# Patient Record
Sex: MALE | Race: OTHER | NOT HISPANIC OR LATINO | URBAN - METROPOLITAN AREA
[De-identification: names, ages, dates, MRNs, and addresses within clinical notes are randomized per-mention and may not be internally consistent; named-entity substitution may affect disease eponyms.]

---
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
## 2023-04-11 NOTE — ED PEDIATRIC NURSE NOTE - TEMPLATE LIST FOR HEAD TO TOE ASSESSMENT
Neuro Opzelura Counseling:  I discussed with the patient the risks of Opzelura including but not limited to nasopharngitis, bronchitis, ear infection, eosinophila, hives, diarrhea, folliculitis, tonsillitis, and rhinorrhea.  Taken orally, this medication has been linked to serious infections; higher rate of mortality; malignancy and lymphoproliferative disorders; major adverse cardiovascular events; thrombosis; thrombocytopenia, anemia, and neutropenia; and lipid elevations.

## 2023-11-02 ENCOUNTER — INPATIENT (INPATIENT)
Facility: HOSPITAL | Age: 18
LOS: 0 days | Discharge: HOME | DRG: 880 | End: 2023-11-03
Attending: STUDENT IN AN ORGANIZED HEALTH CARE EDUCATION/TRAINING PROGRAM | Admitting: STUDENT IN AN ORGANIZED HEALTH CARE EDUCATION/TRAINING PROGRAM
Payer: COMMERCIAL

## 2023-11-02 VITALS
OXYGEN SATURATION: 100 % | HEIGHT: 72 IN | TEMPERATURE: 98 F | RESPIRATION RATE: 16 BRPM | DIASTOLIC BLOOD PRESSURE: 85 MMHG | WEIGHT: 178.57 LBS | HEART RATE: 67 BPM | SYSTOLIC BLOOD PRESSURE: 144 MMHG

## 2023-11-02 LAB
ALBUMIN SERPL ELPH-MCNC: 4.5 G/DL — SIGNIFICANT CHANGE UP (ref 3.3–5)
ALBUMIN SERPL ELPH-MCNC: 4.5 G/DL — SIGNIFICANT CHANGE UP (ref 3.3–5)
ALP SERPL-CCNC: 81 U/L — SIGNIFICANT CHANGE UP (ref 40–120)
ALP SERPL-CCNC: 81 U/L — SIGNIFICANT CHANGE UP (ref 40–120)
ALT FLD-CCNC: 27 U/L — SIGNIFICANT CHANGE UP (ref 10–45)
ALT FLD-CCNC: 27 U/L — SIGNIFICANT CHANGE UP (ref 10–45)
ANION GAP SERPL CALC-SCNC: 9 MMOL/L — SIGNIFICANT CHANGE UP (ref 5–17)
ANION GAP SERPL CALC-SCNC: 9 MMOL/L — SIGNIFICANT CHANGE UP (ref 5–17)
AST SERPL-CCNC: 26 U/L — SIGNIFICANT CHANGE UP (ref 10–40)
AST SERPL-CCNC: 26 U/L — SIGNIFICANT CHANGE UP (ref 10–40)
BASOPHILS # BLD AUTO: 0.07 K/UL — SIGNIFICANT CHANGE UP (ref 0–0.2)
BASOPHILS # BLD AUTO: 0.07 K/UL — SIGNIFICANT CHANGE UP (ref 0–0.2)
BASOPHILS NFR BLD AUTO: 0.6 % — SIGNIFICANT CHANGE UP (ref 0–2)
BASOPHILS NFR BLD AUTO: 0.6 % — SIGNIFICANT CHANGE UP (ref 0–2)
BILIRUB SERPL-MCNC: 0.6 MG/DL — SIGNIFICANT CHANGE UP (ref 0.2–1.2)
BILIRUB SERPL-MCNC: 0.6 MG/DL — SIGNIFICANT CHANGE UP (ref 0.2–1.2)
BUN SERPL-MCNC: 13 MG/DL — SIGNIFICANT CHANGE UP (ref 7–23)
BUN SERPL-MCNC: 13 MG/DL — SIGNIFICANT CHANGE UP (ref 7–23)
CALCIUM SERPL-MCNC: 9.5 MG/DL — SIGNIFICANT CHANGE UP (ref 8.4–10.5)
CALCIUM SERPL-MCNC: 9.5 MG/DL — SIGNIFICANT CHANGE UP (ref 8.4–10.5)
CHLORIDE SERPL-SCNC: 103 MMOL/L — SIGNIFICANT CHANGE UP (ref 96–108)
CHLORIDE SERPL-SCNC: 103 MMOL/L — SIGNIFICANT CHANGE UP (ref 96–108)
CK SERPL-CCNC: 273 U/L — HIGH (ref 30–200)
CK SERPL-CCNC: 273 U/L — HIGH (ref 30–200)
CO2 SERPL-SCNC: 27 MMOL/L — SIGNIFICANT CHANGE UP (ref 22–31)
CO2 SERPL-SCNC: 27 MMOL/L — SIGNIFICANT CHANGE UP (ref 22–31)
CREAT SERPL-MCNC: 0.92 MG/DL — SIGNIFICANT CHANGE UP (ref 0.5–1.3)
CREAT SERPL-MCNC: 0.92 MG/DL — SIGNIFICANT CHANGE UP (ref 0.5–1.3)
EGFR: 122 ML/MIN/1.73M2 — SIGNIFICANT CHANGE UP
EGFR: 122 ML/MIN/1.73M2 — SIGNIFICANT CHANGE UP
EOSINOPHIL # BLD AUTO: 0.19 K/UL — SIGNIFICANT CHANGE UP (ref 0–0.5)
EOSINOPHIL # BLD AUTO: 0.19 K/UL — SIGNIFICANT CHANGE UP (ref 0–0.5)
EOSINOPHIL NFR BLD AUTO: 1.6 % — SIGNIFICANT CHANGE UP (ref 0–6)
EOSINOPHIL NFR BLD AUTO: 1.6 % — SIGNIFICANT CHANGE UP (ref 0–6)
ETHANOL SERPL-MCNC: <10 MG/DL — SIGNIFICANT CHANGE UP (ref 0–10)
ETHANOL SERPL-MCNC: <10 MG/DL — SIGNIFICANT CHANGE UP (ref 0–10)
GLUCOSE SERPL-MCNC: 98 MG/DL — SIGNIFICANT CHANGE UP (ref 70–99)
GLUCOSE SERPL-MCNC: 98 MG/DL — SIGNIFICANT CHANGE UP (ref 70–99)
HCT VFR BLD CALC: 39.4 % — SIGNIFICANT CHANGE UP (ref 39–50)
HCT VFR BLD CALC: 39.4 % — SIGNIFICANT CHANGE UP (ref 39–50)
HGB BLD-MCNC: 13.7 G/DL — SIGNIFICANT CHANGE UP (ref 13–17)
HGB BLD-MCNC: 13.7 G/DL — SIGNIFICANT CHANGE UP (ref 13–17)
IMM GRANULOCYTES NFR BLD AUTO: 0.3 % — SIGNIFICANT CHANGE UP (ref 0–0.9)
IMM GRANULOCYTES NFR BLD AUTO: 0.3 % — SIGNIFICANT CHANGE UP (ref 0–0.9)
LACTATE SERPL-SCNC: 0.7 MMOL/L — SIGNIFICANT CHANGE UP (ref 0.5–2)
LACTATE SERPL-SCNC: 0.7 MMOL/L — SIGNIFICANT CHANGE UP (ref 0.5–2)
LYMPHOCYTES # BLD AUTO: 2.81 K/UL — SIGNIFICANT CHANGE UP (ref 1–3.3)
LYMPHOCYTES # BLD AUTO: 2.81 K/UL — SIGNIFICANT CHANGE UP (ref 1–3.3)
LYMPHOCYTES # BLD AUTO: 24.1 % — SIGNIFICANT CHANGE UP (ref 13–44)
LYMPHOCYTES # BLD AUTO: 24.1 % — SIGNIFICANT CHANGE UP (ref 13–44)
MAGNESIUM SERPL-MCNC: 1.9 MG/DL — SIGNIFICANT CHANGE UP (ref 1.6–2.6)
MAGNESIUM SERPL-MCNC: 1.9 MG/DL — SIGNIFICANT CHANGE UP (ref 1.6–2.6)
MCHC RBC-ENTMCNC: 30.6 PG — SIGNIFICANT CHANGE UP (ref 27–34)
MCHC RBC-ENTMCNC: 30.6 PG — SIGNIFICANT CHANGE UP (ref 27–34)
MCHC RBC-ENTMCNC: 34.8 GM/DL — SIGNIFICANT CHANGE UP (ref 32–36)
MCHC RBC-ENTMCNC: 34.8 GM/DL — SIGNIFICANT CHANGE UP (ref 32–36)
MCV RBC AUTO: 87.9 FL — SIGNIFICANT CHANGE UP (ref 80–100)
MCV RBC AUTO: 87.9 FL — SIGNIFICANT CHANGE UP (ref 80–100)
MONOCYTES # BLD AUTO: 0.94 K/UL — HIGH (ref 0–0.9)
MONOCYTES # BLD AUTO: 0.94 K/UL — HIGH (ref 0–0.9)
MONOCYTES NFR BLD AUTO: 8.1 % — SIGNIFICANT CHANGE UP (ref 2–14)
MONOCYTES NFR BLD AUTO: 8.1 % — SIGNIFICANT CHANGE UP (ref 2–14)
NEUTROPHILS # BLD AUTO: 7.61 K/UL — HIGH (ref 1.8–7.4)
NEUTROPHILS # BLD AUTO: 7.61 K/UL — HIGH (ref 1.8–7.4)
NEUTROPHILS NFR BLD AUTO: 65.3 % — SIGNIFICANT CHANGE UP (ref 43–77)
NEUTROPHILS NFR BLD AUTO: 65.3 % — SIGNIFICANT CHANGE UP (ref 43–77)
NRBC # BLD: 0 /100 WBCS — SIGNIFICANT CHANGE UP (ref 0–0)
NRBC # BLD: 0 /100 WBCS — SIGNIFICANT CHANGE UP (ref 0–0)
PLATELET # BLD AUTO: 180 K/UL — SIGNIFICANT CHANGE UP (ref 150–400)
PLATELET # BLD AUTO: 180 K/UL — SIGNIFICANT CHANGE UP (ref 150–400)
POTASSIUM SERPL-MCNC: 4.3 MMOL/L — SIGNIFICANT CHANGE UP (ref 3.5–5.3)
POTASSIUM SERPL-MCNC: 4.3 MMOL/L — SIGNIFICANT CHANGE UP (ref 3.5–5.3)
POTASSIUM SERPL-SCNC: 4.3 MMOL/L — SIGNIFICANT CHANGE UP (ref 3.5–5.3)
POTASSIUM SERPL-SCNC: 4.3 MMOL/L — SIGNIFICANT CHANGE UP (ref 3.5–5.3)
PROT SERPL-MCNC: 7.4 G/DL — SIGNIFICANT CHANGE UP (ref 6–8.3)
PROT SERPL-MCNC: 7.4 G/DL — SIGNIFICANT CHANGE UP (ref 6–8.3)
RBC # BLD: 4.48 M/UL — SIGNIFICANT CHANGE UP (ref 4.2–5.8)
RBC # BLD: 4.48 M/UL — SIGNIFICANT CHANGE UP (ref 4.2–5.8)
RBC # FLD: 12.5 % — SIGNIFICANT CHANGE UP (ref 10.3–14.5)
RBC # FLD: 12.5 % — SIGNIFICANT CHANGE UP (ref 10.3–14.5)
SODIUM SERPL-SCNC: 139 MMOL/L — SIGNIFICANT CHANGE UP (ref 135–145)
SODIUM SERPL-SCNC: 139 MMOL/L — SIGNIFICANT CHANGE UP (ref 135–145)
WBC # BLD: 11.65 K/UL — HIGH (ref 3.8–10.5)
WBC # BLD: 11.65 K/UL — HIGH (ref 3.8–10.5)
WBC # FLD AUTO: 11.65 K/UL — HIGH (ref 3.8–10.5)
WBC # FLD AUTO: 11.65 K/UL — HIGH (ref 3.8–10.5)

## 2023-11-02 PROCEDURE — 99222 1ST HOSP IP/OBS MODERATE 55: CPT

## 2023-11-02 PROCEDURE — 99223 1ST HOSP IP/OBS HIGH 75: CPT

## 2023-11-02 PROCEDURE — 99285 EMERGENCY DEPT VISIT HI MDM: CPT

## 2023-11-02 PROCEDURE — 71046 X-RAY EXAM CHEST 2 VIEWS: CPT | Mod: 26

## 2023-11-02 PROCEDURE — 70450 CT HEAD/BRAIN W/O DYE: CPT | Mod: 26,MA

## 2023-11-02 RX ORDER — INFLUENZA VIRUS VACCINE 15; 15; 15; 15 UG/.5ML; UG/.5ML; UG/.5ML; UG/.5ML
0.5 SUSPENSION INTRAMUSCULAR ONCE
Refills: 0 | Status: DISCONTINUED | OUTPATIENT
Start: 2023-11-02 | End: 2023-11-03

## 2023-11-02 RX ORDER — OLANZAPINE 15 MG/1
2.5 TABLET, FILM COATED ORAL ONCE
Refills: 0 | Status: COMPLETED | OUTPATIENT
Start: 2023-11-02 | End: 2023-11-02

## 2023-11-02 RX ORDER — OLANZAPINE 15 MG/1
5 TABLET, FILM COATED ORAL ONCE
Refills: 0 | Status: COMPLETED | OUTPATIENT
Start: 2023-11-02 | End: 2023-11-02

## 2023-11-02 RX ORDER — LEVETIRACETAM 250 MG/1
2000 TABLET, FILM COATED ORAL ONCE
Refills: 0 | Status: COMPLETED | OUTPATIENT
Start: 2023-11-02 | End: 2023-11-02

## 2023-11-02 RX ORDER — LEVETIRACETAM 250 MG/1
500 TABLET, FILM COATED ORAL EVERY 12 HOURS
Refills: 0 | Status: DISCONTINUED | OUTPATIENT
Start: 2023-11-02 | End: 2023-11-02

## 2023-11-02 RX ORDER — LEVETIRACETAM 250 MG/1
1000 TABLET, FILM COATED ORAL ONCE
Refills: 0 | Status: COMPLETED | OUTPATIENT
Start: 2023-11-02 | End: 2023-11-02

## 2023-11-02 RX ADMIN — OLANZAPINE 2.5 MILLIGRAM(S): 15 TABLET, FILM COATED ORAL at 09:45

## 2023-11-02 RX ADMIN — LEVETIRACETAM 400 MILLIGRAM(S): 250 TABLET, FILM COATED ORAL at 04:28

## 2023-11-02 RX ADMIN — LEVETIRACETAM 600 MILLIGRAM(S): 250 TABLET, FILM COATED ORAL at 04:28

## 2023-11-02 RX ADMIN — OLANZAPINE 5 MILLIGRAM(S): 15 TABLET, FILM COATED ORAL at 11:16

## 2023-11-02 RX ADMIN — Medication 2 MILLIGRAM(S): at 04:30

## 2023-11-02 NOTE — DISCHARGE NOTE PROVIDER - NSDCCPCAREPLAN_GEN_ALL_CORE_FT
PRINCIPAL DISCHARGE DIAGNOSIS  Diagnosis: Seizure-like activity  Assessment and Plan of Treatment:      PRINCIPAL DISCHARGE DIAGNOSIS  Diagnosis: Seizure-like activity  Assessment and Plan of Treatment: You had multiple events of shaking at home and in the emergency department concerning for possible seizures and initially were given a medication called Keppra. You were admitted to the neurology service and placed on vEEG. Multiple episodes of extremitiy shaking like the ones you were experiencing at home were captured and were not seizures on the EEG. Lab work was sent after your event to look for markers in your blood that could be elevated if the episodes were seizures and they came back normal, indicating these events were less likely seizures. 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 You were evaluated by psychiatry due to agitation (likely related to Keppra) and cleared you for discharge home and provided resources for outpatient follow up. People with non-epileptic seizures can benefit from following with a psychologist and participating in Cognitive Behavioral Therapy. You can follow up with a neurology provider 6-8 weeks, Dr. Alba or Malinda WESTBROOK in 2 months.

## 2023-11-02 NOTE — DISCHARGE NOTE PROVIDER - CARE PROVIDER_API CALL
Luis Manuel Alba  Neurology  [address] [address]  Phone: [phone]  Fax: [phone]  Follow Up Time: 1 month   Malinda Ryan  Physician Assistant Services  [address] [address]  Phone: [phone]  Fax: [phone]  Follow Up Time: 2 months

## 2023-11-02 NOTE — ED PROVIDER NOTE - OBJECTIVE STATEMENT
18M no PMH brought in by mom for possible seizure activity tonight. pt states he had had some chest pain earlier tonight. states got up to use the bathroom and went back to bed. mom states then he had whole body shaking and fell out of bed. states she couldn't wake him and his whole body was stiff. thinks it may have lasted ~5min. upon awakening pt was slow to answer questions, c/o feeling tired and heavy. no HA. no vomiting. denies drugs/alcohol. had similar episode 10/10 but did not see anyone at that time. no prior hx, no fam hx of seizures. no numbness/weakness. no tongue biting, no urinary incontinence.

## 2023-11-02 NOTE — ED ADULT NURSE NOTE - OBJECTIVE STATEMENT
Pt complaints of seizure like movement w/ witness around 1AM today. Girlfriend reports pt had full body shaking lasting 5 minitues after falling a sleeping. Mom reports finding pt on floor twitching uncontrollably. Pt doesn't remember the event.   Denies any alcohol/drug use. Denies any other discomfort at this time but weakness.  Pt is alert and oriented, A&O4. Family member is on bedside.

## 2023-11-02 NOTE — H&P ADULT - ATTENDING COMMENTS
Pt with concern for recent onset seizures.  In ED, lactate only 0.7.  Girlfriend had recorded video of 2 events, which had uncharacteristic body flopping and limb movements without typical tonic or clonic activity.  Pt loaded with 3000mg levetiracetam after the second event last night - very agitated requiring zyprexa several times and even then became agitated with attempts at rehooking.    Plan:  1) will need further vEEG for evaluation, but suspicion high for non-epileptic events at this point, but not certain.
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
## 2023-11-02 NOTE — BH CONSULTATION LIAISON ASSESSMENT NOTE - NSBHATTESTCOMMENTATTENDFT_PSY_A_CORE
Pt seen and examined separately; case d/w pt's mother. A/P above are by writer - recommend 1:1 observation for agitation risk iso acute delirium. Will follow

## 2023-11-02 NOTE — H&P ADULT - NSHPPHYSICALEXAM_GEN_ALL_CORE
Physical exam:  General: No acute distress, awake and alert    Neurologic:  -Mental status: Awake, alert, oriented to person, place, and time. Speech is fluent with intact naming, repetition, and comprehension, no dysarthria. Recent and remote memory intact. Follows commands. Attention/concentration intact. Fund of knowledge appropriate.  -Cranial nerves:   II: Visual fields are full to confrontation.  III, IV, VI: Extraocular movements are intact without nystagmus. Pupils equally round and reactive to light  V:  Facial sensation V1-V3 equal and intact   VII: Face is symmetric with normal eye closure and smile  VIII: Hearing is bilaterally intact to finger rub  IX, X: Uvula is midline and soft palate rises symmetrically  XI: Head turning and shoulder shrug are intact.  XII: Tongue protrudes midline  Motor: Normal bulk and tone. No pronator drift. Strength bilateral upper extremity 5/5, bilateral lower extremities 5/5.  Rapid alternating movements intact and symmetric  Sensation: Intact to light touch bilaterally. No neglect or extinction on double simultaneous testing.  Coordination: No dysmetria on finger-to-nose and heel-to-shin bilaterally  Reflexes: Downgoing toes bilaterally   Gait: Narrow gait and steady

## 2023-11-02 NOTE — H&P ADULT - NSHPLABSRESULTS_GEN_ALL_CORE
.  LABS:                         13.7   11.65 )-----------( 180      ( 02 Nov 2023 03:35 )             39.4     11-02    139  |  103  |  13  ----------------------------<  98  4.3   |  27  |  0.92    Ca    9.5      02 Nov 2023 03:35  Mg     1.9     11-02    TPro  7.4  /  Alb  4.5  /  TBili  0.6  /  DBili  x   /  AST  26  /  ALT  27  /  AlkPhos  81  11-02      Urinalysis Basic - ( 02 Nov 2023 03:35 )    Color: x / Appearance: x / SG: x / pH: x  Gluc: 98 mg/dL / Ketone: x  / Bili: x / Urobili: x   Blood: x / Protein: x / Nitrite: x   Leuk Esterase: x / RBC: x / WBC x   Sq Epi: x / Non Sq Epi: x / Bacteria: x      CARDIAC MARKERS ( 02 Nov 2023 03:35 )  x     / x     / 273 U/L / x     / x            Lactate, Blood: 0.7 mmol/L (11-02 @ 03:35)      RADIOLOGY, EKG & ADDITIONAL TESTS: Reviewed.

## 2023-11-02 NOTE — H&P ADULT - NSHPSOCIALHISTORY_GEN_ALL_CORE
Patient lives with his mother, girlfriend, is preparing for college admission and works at [employer] part time. He denies any illict drug use, alcohol abuse or smoking.

## 2023-11-02 NOTE — PATIENT PROFILE ADULT - FALL HARM RISK - HARM RISK INTERVENTIONS
Assistance with ambulation/Assistance OOB with selected safe patient handling equipment/Communicate Risk of Fall with Harm to all staff/Discuss with provider need for PT consult/Monitor gait and stability/Reinforce activity limits and safety measures with patient and family/Tailored Fall Risk Interventions/Visual Cue: Yellow wristband and red socks/Bed in lowest position, wheels locked, appropriate side rails in place/Call bell, personal items and telephone in reach/Instruct patient to call for assistance before getting out of bed or chair/Non-slip footwear when patient is out of bed/[place] to call system/Physically safe environment - no spills, clutter or unnecessary equipment/Purposeful Proactive Rounding/Room/bathroom lighting operational, light cord in reach

## 2023-11-02 NOTE — DISCHARGE NOTE PROVIDER - NSDCFUADDAPPT_GEN_ALL_CORE_FT
Psychiatry/Psychology Follow-up:    •	[facility]  o	[email or web address]  o	Three locations  ?	[address] [place] [address]  Phone: [phone]  ?	[address] [facility] [address] [place] [address]  Phone: [phone]  ?	[address] [facility] [address] [place] [address]  Phone: [phone]  o	Medicare, Medicaid, most private insurance and sliding scale  •	[facility]  o	[email or web address]  o	[address]  [place], [place] [address]   [phone] ([phone] for intakes)  o	Medicare, Medicaid, most private insurance (including [employer])  •	[facility]  o	[email or web address]  o	[address], [place] [address]   [phone]  o	Medicare, Medicaid, Aetna, Affinity, [employer], BCBS, [facility], [place], GHI, Healthfirst, HealthPlus, MetroPlus, Wellcare  •	[facility]  o	[email or web address]  o	[address] [place] [address]  [phone]  Intake hours for new patients: Tuesdays and Thursdays at 8am  o	Medicare, Medicaid, most private insurance  •	[facility]  o	[email or web address]  o	[address] [place] [address]  (corner [address])  Telephone: [phone]  o	Medicare, Medicaid, most private insurance  •	[facility]  o	[email or web address]  o	Two locations  ?	[address] ([address])  [place] [address]  [phone]  ?	[address] [place], [place] [address] [phone]  o	Medicare, Medicaid, most private insurance

## 2023-11-02 NOTE — BH CONSULTATION LIAISON ASSESSMENT NOTE - NSBHTIMEACTIVITIESPERFORMED_PSY_A_CORE
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

## 2023-11-02 NOTE — DISCHARGE NOTE PROVIDER - NSDCFUADDINST_GEN_ALL_CORE_FT
Seizure Safety Instructions  1. [facility] law mandates you to self-report your seizure disorder to [facility], and be seizure-free for 1yr before you can drive.   2. Avoid swimming, diving, taking a bath, cooking, use of motorized machineries.  3. Avoid climbing a ladder, trees or any height.  4. Use machines with safety switches.  5. Always be aware of your surroundings and make sure family and friends are aware of your seizures.  6. Use non-breakable dishes.  7. Consider wearing a medical bracelet to inform people you have epilepsy in case of an emergency.

## 2023-11-02 NOTE — H&P ADULT - HISTORY OF PRESENT ILLNESS
Patient is a 19 y/o Male with no PMH who presents after having a seizure like activity at home. Patient states that around 12:40 am he felt some discomfort in his chest and went to the bathroom. He then returned to his bed and the next thing he knows is that he is on the floor and is mother is trying to wake him up. His mother was present at the bedside who states that she heard a thud and found his son on the floor violently shaking. The episode lasted approx. 5 mins and the patient states that after regaining consciousness he was confused for a min. He denies any episode of incontinence or tongue bite. Patient' girlfriend who was present bedside stated that on oct 10 the patient had a similar episode where she saw his limbs jerking while he was asleep but did not seek any medical help. Patient does c/o some headache but denies any double vision, dizziness, numbness or weakness.    In the ED:   Vitals: T 98, HR 67, /85, RR 16, SPO2 100%  Labs: WBC 11.65, Hb 13.7, Na 139, K 4.3, Cr 0.92, Lactate 0.7, , BAL <10  The patient has another seizure while in the ED and is loaded with Keppra 3000mg and admitted to EMU for stabilization and vEEG.  Patient is a 19 y/o Male with no PMH who presents after having a seizure like activity at home. Patient states that around 12:40 am he felt some discomfort in his chest and went to the bathroom. He then returned to his bed and the next thing he knows is that he is on the floor and is mother is trying to wake him up. His mother was present at the bedside who states that she heard a thud and found his son on the floor violently shaking. The episode lasted approx. 5 mins and the patient states that after regaining consciousness he was confused for a min. He denies any episode of incontinence or tongue bite. Patient' girlfriend who was present bedside stated that in march and on oct 10 the patient had a similar episode where she saw his limbs jerking while he was asleep but did not seek any medical help. Patient does c/o some headache but denies any double vision, dizziness, numbness or weakness.    In the ED:   Vitals: T 98, HR 67, /85, RR 16, SPO2 100%  Labs: WBC 11.65, Hb 13.7, Na 139, K 4.3, Cr 0.92, Lactate 0.7, , BAL <10  The patient has another seizure while in the ED and is loaded with Keppra 3000mg and admitted to EMU for stabilization and vEEG.

## 2023-11-02 NOTE — DISCHARGE NOTE PROVIDER - ATTENDING DISCHARGE PHYSICAL EXAMINATION:
Pt awake and alert, though 'blacked-out' during the discussion and had to be repeated.  Affect was mildly withdrawn, but was quick to show he understood the discussion and current diagnosis.  EOM full without nystagmus and no pronator drift and gait normal.

## 2023-11-02 NOTE — BH CONSULTATION LIAISON ASSESSMENT NOTE - RISK ASSESSMENT
At high acute violence risk, likely low acute suicide risk based on available history though interview limited, likely low chronic violence risk. Risk factors include recent confusion with acute agitation, acute medical condition.   Static risk factors include male gender, otherwise unknown  Protective factors include lack of known h/o violence or agitation or suicidality, lack of prior psychiatric dx, residential stability, family support, engaged in work

## 2023-11-02 NOTE — PATIENT PROFILE ADULT - HAS THE PATIENT RECEIVED THE INFLUENZA VACCINE THIS SEASON?
[address] [facility]  [address] [place] [address]  Dept: [phone]  Dept Fax: [phone]  Loc: [phone]     Subjective     Yovanny Grand 53 y.o. female presents 3/5/21 with   Chief Complaint   Patient presents with   Cornelia Garcia Doctor     ne pt    Neck Pain     x 2 weeks        HPI     New patient here to [place], previous PCP in [place]    History of anxiety-Patient was placed on effexor because she was having heart palpitations, thought it was related to anxiety, states the heart palpitations went away around a month later    Patient has been getting hot flashes for the past couple months, thinks effexor is contributing to this, states she would like to cut back, has been on it for two years, has been taking the 75 mg for around 6 months    Also having neck issues, thinks she feels a lump in the back of her neck, just noticed it two weeks ago, it is not sore, does not believe she did anything to her neck, however, she is always lifting and sore in her neck and shoulders, works in [employer]    Also thinks she has an enlarged lymph node, sort of tender, unsure of how long it has been there, couple weeks- months?      Patient states she is also taking Kratom, has helped her with her pain     Reviewed the following history:    Past Medical History:   Diagnosis Date    Anemia, unspecified 3/8/2017    Anxiety       Past Surgical History:   Procedure Laterality Date    HYSTERECTOMY      Partial hysterectomy     Family History   Problem Relation Age of Onset    Diabetes Father     High Blood Pressure Father     High Cholesterol Father     Heart Disease Father     Heart Attack Father     Deep Vein Thrombosis Paternal Grandmother     Heart Attack Paternal Grandfather     Breast Cancer Maternal Aunt     Breast Cancer Maternal Grandmother        No Known Allergies    Current Outpatient Medications on File Prior to Visit   Medication Sig Dispense Refill    venlafaxine (EFFEXOR) 75 MG tablet Take 75 mg by mouth daily       No current facility-administered medications on file prior to visit. Review of Systems   Constitutional: Negative. HENT:        Possible swollen lymph node   Respiratory: Negative. Cardiovascular: Negative. Gastrointestinal: Negative. Musculoskeletal:        Possible lump on neck   Skin: Negative. Objective    Vitals:    03/01/21 1442   BP: 126/78   Pulse: 75   Temp: 97.1 °F (36.2 °C)   SpO2: 96%   Weight: 115 lb (52.2 kg)   Height: 5' 2\" (1.575 m)       Physical Exam  Constitutional:       General: She is not in acute distress. Appearance: Normal appearance. She is not ill-appearing or toxic-appearing. HENT:      Head: Normocephalic and atraumatic. Right Ear: Hearing and external ear normal.      Left Ear: Hearing and external ear normal.   Eyes:      General: Lids are normal.      Conjunctiva/sclera: Conjunctivae normal.   Neck:      Musculoskeletal: Neck supple. Cardiovascular:      Rate and Rhythm: Normal rate and regular rhythm. Heart sounds: Normal heart sounds. Pulmonary:      Effort: Pulmonary effort is normal. No respiratory distress. Breath sounds: Normal breath sounds. No decreased breath sounds, wheezing, rhonchi or rales. Skin:         Neurological:      General: No focal deficit present. Mental Status: She is alert and oriented to person, place, and time. Psychiatric:         Attention and Perception: Attention normal.         Mood and Affect: Mood normal.         Speech: Speech normal.         Behavior: Behavior normal.         Thought Content: Thought content normal.         Cognition and Memory: Cognition normal.       Assessment and Plan    Dayron Vo 53 y.o. female presenting for annual exam, neck issues     1. Annual physical exam  - Comprehensive Metabolic Panel; Future  - Lipid Panel; Future    2.  Need for hepatitis C screening test  - Hepatitis C Antibody; Future    3. Encounter for screening for HIV  - HIV Screen; Future    4. Encounter for screening mammogram for malignant neoplasm of breast  - Mammogram    5. History of anemia  - CBC; Future    6. Anxiety  - CBC; Future  - TSH with Reflex; Future    7. Tender lymph node  - Discussed obtaining US or CT of neck. Patient declined further workup at this time    8. Localized swelling, mass and lump, neck  - Discussed obtaining US or CT of neck. Patient declined further workup at this time      Return after obtaining labs or if symptoms persist, for follow up. Side effects and adverse effects of any medication prescribed today, as well as treatment plan/rationale, follow-up care, and result expectations have been discussed with the patient. Expresses understanding and desires to proceed with treatment plan. Discussed signs and symptoms which require immediate follow-up in ED/call to 911. Understanding verbalized. I have reviewed and updated the electronic medical record.     ARELY Santo - CNP no...
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
## 2023-11-02 NOTE — ED ADULT TRIAGE NOTE - CHIEF COMPLAINT QUOTE
Pt, with no reported PMH, presents to ER c/o "heaviness in my legs and arms" s/p witnessed seizure like activity at ~0050 tonight. Girlfriend reports pt had episode of "full body shaking" lasting ~5 minutes after falling asleep. Mom reports finding pt on floor "twitching uncontrollably". Pt does not recall events, denies any alcohol/drug use. [id number]. Pt reports having epigastric pain, going to restroom and lying down prior to incident. Family reports similar episode on 10/10

## 2023-11-02 NOTE — DISCHARGE NOTE PROVIDER - PROVIDER TOKENS
PROVIDER:[TOKEN:[[id number]],FOLLOWUP:[1 month]] PROVIDER:[TOKEN:[[id number]],FOLLOWUP:[2 months]]

## 2023-11-02 NOTE — BH CONSULTATION LIAISON ASSESSMENT NOTE - NSBHCHARTREVIEWINVESTIGATE_PSY_A_CORE FT
EKG in ED with QTc 355    CT HEAD 11/2    ACC: [id number] EXAM:  CT BRAIN   ORDERED BY: JOHN BROWN     PROCEDURE DATE:  11/02/2023          INTERPRETATION:  INDICATIONS: Possible seizure.    TECHNIQUE: Serial axial images were obtained from the skull base to the   vertex without the use of intravenous contrast. Sagittal and coronal   images were reformatted.    COMPARISON EXAMINATION: CT of the head from 9/25/2017.    FINDINGS:  VENTRICLES AND SULCI: Parenchymal volume is consistent with patient age.  INTRA-AXIAL: No intracranial masslike lesion, acute hemorrhage or acute   transcortical infarct is seen.  EXTRA-AXIAL: No fluid collection is present.  VISUALIZED SINUSES: No air-fluid levels are identified.  VISUALIZED MASTOIDS: Clear.  CALVARIUM: No fracture.    IMPRESSION:  No acute intracranial findings.    --- End of Report ---

## 2023-11-02 NOTE — BH CONSULTATION LIAISON ASSESSMENT NOTE - NSBHCHARTREVIEWVS_PSY_A_CORE FT
Vital Signs Last 24 Hrs  T(C): 36.4 (02 Nov 2023 05:42), Max: 36.7 (02 Nov 2023 02:58)  T(F): 97.6 (02 Nov 2023 05:42), Max: 98 (02 Nov 2023 02:58)  HR: 50 (02 Nov 2023 11:29) (50 - 82)  BP: 107/60 (02 Nov 2023 11:29) (107/60 - 144/85)  BP(mean): --  RR: 18 (02 Nov 2023 11:29) (16 - 18)  SpO2: 100% (02 Nov 2023 11:29) (96% - 100%)    Parameters below as of 02 Nov 2023 11:29  Patient On (Oxygen Delivery Method): room air

## 2023-11-02 NOTE — EEG REPORT - NS EEG TEXT BOX
[facility] Department of Neurology  Inpatient Continuous video-Electroencephalogram  [address], [address] [place] [id number], T: [phone]    Patient Name:	JASMYN MASTERSON    :	1990  MRN:	[id number]    Study Start Date/Time:	2023, 7:03:33 PM  Study End Date/Time:    Referred by:  Choose an item.    Brief Clinical History:  JASMYN MASTERSON is a 33 year old Male with no PMH who presents after having seizure-like activity at home. Patient states that around 12:40 am he felt some discomfort in his chest and went to the bathroom. He then returned to his bed and the next thing he knows is that he is on the floor and is mother is trying to wake him up. His mother was present at the bedside who states that she heard a thud and found his son on the floor violently shaking. The episode lasted approx. 5 mins and the patient states that after regaining consciousness he was confused for a min; Has been seen to have limb jerking from sleep in the past. Study performed to investigate for seizures or markers of epilepsy.   Technologist notes: -  Diagnosis Code:  R56.9 convulsions/seizure    Pertinent Medication:  Keppra 3000mg load    Acquisition Details:  Electroencephalography was acquired using a minimum of 21 channels on an [employer] system v 9.3.1 with electrode placement according to the standard International 10-20 system following ACNS (American Clinical Neurophysiology Society) guidelines.  Anterior temporal T1 and T2 electrodes were utilized whenever possible.  The [employer] automated spike & seizure detections were all reviewed in detail, in addition to the entire raw EEG.    Findings:  Day 1:  2023, 7:03:33 PM to next morning at 07:00 AM   Background:  continuous, with predominantly alpha and beta frequencies.  Generalized Slowing:  None  Symmetry/Focality: No persistent asymmetries of voltage or frequency.     Voltage:  Normal (20+ uV)  Organization:  Appropriate anterior-posterior gradient  Posterior Dominant Rhythm:  9 Hz symmetric, well-organized, and well-modulated  Sleep:  Symmetric, synchronous spindles and K complexes.  Variability:   Yes		Reactivity:  Yes    Spontaneous Activity:  No epileptiform discharges Events:  1)	No electrographic seizures or significant clinical events occurred during this study.  Provocations:  "	Hyperventilation: was not performed.  "	Photic stimulation: was not performed.  Daily Summary:    1)	There were no findings of active epilepsy, however this alone does not rule out the diagnosis.       Luis Manuel Alba MD  Attending Neurologist, [facility]

## 2023-11-02 NOTE — ED PROVIDER NOTE - PROGRESS NOTE DETAILS
pt had episode of tonic clonic seizure - given ativan. will load with keppra pt had episode of tonic clonic seizure - given ativan. per epilepsy - will admit to neuro service, recommend keppra 3000mg loading

## 2023-11-02 NOTE — ED ADULT NURSE NOTE - NSFALLHARMRISKINTERV_ED_ALL_ED

## 2023-11-02 NOTE — ED ADULT NURSE REASSESSMENT NOTE - NS ED NURSE REASSESS COMMENT FT1
Pt had seizure like movement 2 times during sleeping. MD Rivera is on bedside.   Pt wake up but don't remember event.

## 2023-11-02 NOTE — BH CONSULTATION LIAISON ASSESSMENT NOTE - NSBHCHARTREVIEWLAB_PSY_A_CORE FT
13.7   11.65 )-----------( 180      ( 02 Nov 2023 03:35 )             39.4     11-02    139  |  103  |  13  ----------------------------<  98  4.3   |  27  |  0.92    Ca    9.5      02 Nov 2023 03:35  Mg     1.9     11-02    TPro  7.4  /  Alb  4.5  /  TBili  0.6  /  DBili  x   /  AST  26  /  ALT  27  /  AlkPhos  81  11-02    CK elevated  No utox seen

## 2023-11-02 NOTE — DISCHARGE NOTE PROVIDER - HOSPITAL COURSE
HPI  Patient is a 17 y/o Male with no PMH who presents after having a seizure like activity at home. Patient states that around 12:40 am he felt some discomfort in his chest and went to the bathroom. He then returned to his bed and the next thing he knows is that he is on the floor and is mother is trying to wake him up. His mother was present at the bedside who states that she heard a thud and found his son on the floor violently shaking. The episode lasted approx. 5 mins and the patient states that after regaining consciousness he was confused for a min. He denies any episode of incontinence or tongue bite. Patient' girlfriend who was present bedside stated that in march and on oct 10 the patient had a similar episode where she saw his limbs jerking while he was asleep but did not seek any medical help. Patient does c/o some headache but denies any double vision, dizziness, numbness or weakness.    In the ED:   Vitals: T 98, HR 67, /85, RR 16, SPO2 100%  Labs: WBC 11.65, Hb 13.7, Na 139, K 4.3, Cr 0.92, Lactate 0.7, , BAL <10  The patient has another seizure while in the ED and is loaded with Keppra 3000mg and admitted to EMU for stabilization and vEEG.     Hospital course include ........, and EEG showed.... from date to date. Additional tests performed on date, and the results revealed ...... Medical issues and the interventions (if any). Patient is stable and medically cleared for discharge to home/subacute rehab/acute rehab.     Medications adjusted:    New Medications:    Follow-up:  Please follow-up with  (neruologist) in 4 - 6 weeks ( will call you with a date and time).   Please follow-up with your PCP in 2 weeks  Please follow-up with other specialists in 2 - 4 weeks.      HPI  Patient is a 19 y/o Male with no PMH who presents after having a seizure like activity at home. Patient states that around 12:40 am he felt some discomfort in his chest and went to the bathroom. He then returned to his bed and the next thing he knows is that he is on the floor and is mother is trying to wake him up. His mother was present at the bedside who states that she heard a thud and found his son on the floor violently shaking. The episode lasted approx. 5 mins and the patient states that after regaining consciousness he was confused for a min. He denies any episode of incontinence or tongue bite. Patient' girlfriend who was present bedside stated that in march and on oct 10 the patient had a similar episode where she saw his limbs jerking while he was asleep but did not seek any medical help. Patient does c/o some headache but denies any double vision, dizziness, numbness or weakness.    In the ED:   Vitals: T 98, HR 67, /85, RR 16, SPO2 100%  Labs: WBC 11.65, Hb 13.7, Na 139, K 4.3, Cr 0.92, Lactate 0.7, , BAL <10  The patient has another seizure while in the ED and is loaded with Keppra 3000mg and admitted to EMU for stabilization and vEEG.     Hospital course included: Upon admission patient was initially agitated and combative with staff, locked himself in the bathroom twice and punched a wall. Was given Zyprexa 7.5 mg total and placed on a constant observation. He was seen by psychiatry who recommended _____. Later in the evening on 11/2 he became more calm and cooperative and agreed to the EEG placement. Patient had an event of full body shaking with no changes in vital signs and no EEG correlate.     Medications adjusted:    New Medications:    Follow-up:  Please follow-up with  (neruologist) in 4 - 6 weeks ( will call you with a date and time).   Please follow-up with your PCP in 2 weeks  Please follow-up with other specialists in 2 - 4 weeks.      HPI  Patient is a 17 y/o Male with no PMH who presents after having a seizure like activity at home. Patient states that around 12:40 am he felt some discomfort in his chest and went to the bathroom. He then returned to his bed and the next thing he knows is that he is on the floor and is mother is trying to wake him up. His mother was present at the bedside who states that she heard a thud and found his son on the floor violently shaking. The episode lasted approx. 5 mins and the patient states that after regaining consciousness he was confused for a min. He denies any episode of incontinence or tongue bite. Patient' girlfriend who was present bedside stated that in march and on oct 10 the patient had a similar episode where she saw his limbs jerking while he was asleep but did not seek any medical help. Patient does c/o some headache but denies any double vision, dizziness, numbness or weakness.    In the ED:   Vitals: T 98, HR 67, /85, RR 16, SPO2 100%  Labs: WBC 11.65, Hb 13.7, Na 139, K 4.3, Cr 0.92, Lactate 0.7, , BAL <10  The patient has another seizure while in the ED and is loaded with Keppra 3000mg and admitted to EMU for stabilization and vEEG.     Hospital course included: Upon admission patient was initially agitated and combative with staff, locked himself in the bathroom twice and punched a wall. Was given Zyprexa 7.5 mg total and placed on a constant observation. He was seen by psychiatry who recommended initially recommended a 1:1 observation and Zyprexa 5 mg PRN for agitation. Later in the evening on 11/2 he became more calm and cooperative and agreed to the EEG placement. Patient had an event of full body shaking with no changes in vital signs and no EEG correlate. He had 2 more episodes of shaking with no EEG correlate. Lactate and CK were sent after event and were both normal. Events most likely non-epileptic and could be stress and anxiety related. Psych now recommends___. Xray of R hand and elbow were completed due to punching a wall when agitated     Medications adjusted:  None    New Medications:  None    Follow-up:  Please follow-up with Malinda WESTBROOK in 6-8 weeks ( will call you with a date and time).   Please follow-up with your PCP in 2 weeks  Please follow-up with other specialists in 2 - 4 weeks.      HPI  Patient is a 17 y/o Male with no PMH who presents after having a seizure like activity at home. Patient states that around 12:40 am he felt some discomfort in his chest and went to the bathroom. He then returned to his bed and the next thing he knows is that he is on the floor and is mother is trying to wake him up. His mother was present at the bedside who states that she heard a thud and found his son on the floor violently shaking. The episode lasted approx. 5 mins and the patient states that after regaining consciousness he was confused for a min. He denies any episode of incontinence or tongue bite. Patient' girlfriend who was present bedside stated that in march and on oct 10 the patient had a similar episode where she saw his limbs jerking while he was asleep but did not seek any medical help. Patient does c/o some headache but denies any double vision, dizziness, numbness or weakness.    In the ED:   Vitals: T 98, HR 67, /85, RR 16, SPO2 100%  Labs: WBC 11.65, Hb 13.7, Na 139, K 4.3, Cr 0.92, Lactate 0.7, , BAL <10  The patient has another seizure while in the ED and is loaded with Keppra 3000mg and admitted to EMU for stabilization and vEEG.     Hospital course included: Upon admission patient was initially agitated and combative with staff, locked himself in the bathroom twice and punched a wall. Was given Zyprexa 7.5 mg total and placed on a constant observation. He was seen by psychiatry who recommended initially recommended a 1:1 observation and Zyprexa 5 mg PRN for agitation. Later in the evening on 11/2 he became more calm and cooperative and agreed to the EEG placement. Patient had an event of full body shaking with no changes in vital signs and no EEG correlate. He had 2 more episodes of shaking with no EEG correlate. Lactate and CK were sent after event and were both normal. Events most likely non-epileptic and could be stress and anxiety related. Psych has now cleared patient for discharge home and provided recommendations for outpatient follow up.. Xray of R hand and elbow were completed due to punching a wall when agitated which showed no acute fractures or dislocations. Patient is medically and neurologically cleared for discharge home.    Medications adjusted:  None    New Medications:  None    Follow-up:  Please follow-up with Malinda WESTBROOK in 6-8 weeks ( will call you with a date and time).   Please follow-up with your PCP in 2 weeks  Please follow-up with other specialists in 2 - 4 weeks.      HPI  Patient is a 17 y/o Male with no PMH who presents after having seizure-like activity at home. Patient states that around 12:40 am he felt some discomfort in his chest and went to the bathroom. He then returned to his bed and the next thing he knows is that he is on the floor and is mother is trying to wake him up. His mother was present at the bedside who states that she heard a thud and found his son on the floor violently shaking. The episode lasted approx. 5 mins and the patient states that after regaining consciousness he was confused for a min. He denies any episode of incontinence or tongue bite. Patient' girlfriend who was present bedside stated that in march and on oct 10 the patient had a similar episode where she saw his limbs jerking or flailing while he was asleep but did not seek any medical help. She had video of 2 events, which showed shuddering movements of the torso and then limbs, without typical tonic or clonic components.  Patient does c/o some headache but denies any double vision, dizziness, numbness or weakness.    In the ED:   Vitals: T 98, HR 67, /85, RR 16, SPO2 100%  Labs: WBC 11.65, Hb 13.7, Na 139, K 4.3, Cr 0.92, Lactate 0.7, , BAL <10  The patient was witnessed to have another similar event while in the ED and is loaded with Keppra 3000mg and admitted to EMU for stabilization and vEEG.     Hospital course included: Upon admission patient was initially agitated and combative with staff, locked himself in the bathroom twice and punched a wall. Was given Zyprexa 7.5 mg total and placed on a constant observation. He was seen by psychiatry who recommended initially recommended a 1:1 observation and Zyprexa 5 mg PRN for agitation. Later in the evening on 11/2 he became more calm and cooperative and agreed to the EEG placement. Patient had an event of full body shaking with no changes in vital signs and no EEG correlate. He had 2 more episodes of shaking with no EEG correlate. Lactate and CK were sent after event and were both normal. Events most likely non-epileptic and could be stress and anxiety related. Psych has now cleared patient for discharge home and provided recommendations for outpatient follow up.  Xray of R hand and elbow were completed due to punching a wall when agitated which showed no acute fractures or dislocations. Patient is medically and neurologically cleared for discharge home.  The patient and mother were told that the evidence showed at least that these were unlikely to be epileptic in origin, emphasized the positive aspect of the news, and told that often the alternative is a stress-related reaction or dissociative event related in past or current stresses or concerns.  He denied having any specific stresses.  Teach-back was utilized and the patient repeated back the above.    Medications adjusted:  None    New Medications:  None    Follow-up:  Please follow-up with Malinda WESTBROOK in 6-8 weeks ( will call you with a date and time).   Please follow-up with your PCP in 2 weeks  Please follow-up with other specialists in 2 - 4 weeks.

## 2023-11-02 NOTE — CONSULT NOTE ADULT - SUBJECTIVE AND OBJECTIVE BOX
Prn tessalon given per mar for c/o cough.  Patient is increased from 9L to 10L and is 91% Patient is a 18y old  Male who presents with a chief complaint of seizures (02 Nov 2023 18:42)    INTERVAL EVENTS:  Sleepy but arousable at time of exam (had just received zyprexa) ; oriented to self, year, [facility]     SUBJECTIVE:  Patient was seen and examined at bedside.    MEDICATIONS:  MEDICATIONS  (STANDING):  influenza   Vaccine 0.5 milliLiter(s) IntraMuscular once  LORazepam   Injectable 2 milliGRAM(s) IV Push once    MEDICATIONS  (PRN):      Allergies    No Known Allergies    Intolerances        OBJECTIVE:  Vital Signs Last 24 Hrs  T(C): 36.6 (02 Nov 2023 20:38), Max: 36.7 (02 Nov 2023 02:58)  T(F): 97.8 (02 Nov 2023 20:38), Max: 98 (02 Nov 2023 02:58)  HR: 42 (02 Nov 2023 20:38) (42 - 82)  BP: 100/61 (02 Nov 2023 20:38) (100/61 - 144/85)  BP(mean): --  RR: 14 (02 Nov 2023 20:38) (14 - 18)  SpO2: 100% (02 Nov 2023 20:38) (96% - 100%)    Parameters below as of 02 Nov 2023 20:38  Patient On (Oxygen Delivery Method): room air      I&O's Summary      PHYSICAL EXAM:  Gen: Reclining in bed at time of exam, appears stated age  HEENT: MMM, clear OP  Neck: supple, trachea at midline  CV: RRR, +S1/S2  Pulm: adequate respiratory effort, no increase in work of breathing  Abd: soft, ND  Skin: warm and dry,   Ext: no LE edema  Neuro: AOx3, speaking in full sentences  Psych: affect and behavior appropriate, pleasant at time of interview    LABS:                        13.7   11.65 )-----------( 180      ( 02 Nov 2023 03:35 )             39.4     11-02    139  |  103  |  13  ----------------------------<  98  4.3   |  27  |  0.92    Ca    9.5      02 Nov 2023 03:35  Mg     1.9     11-02    TPro  7.4  /  Alb  4.5  /  TBili  0.6  /  DBili  x   /  AST  26  /  ALT  27  /  AlkPhos  81  11-02    LIVER FUNCTIONS - ( 02 Nov 2023 03:35 )  Alb: 4.5 g/dL / Pro: 7.4 g/dL / ALK PHOS: 81 U/L / ALT: 27 U/L / AST: 26 U/L / GGT: x             CAPILLARY BLOOD GLUCOSE      POCT Blood Glucose.: 96 mg/dL (02 Nov 2023 02:58)    Urinalysis Basic - ( 02 Nov 2023 03:35 )    Color: x / Appearance: x / SG: x / pH: x  Gluc: 98 mg/dL / Ketone: x  / Bili: x / Urobili: x   Blood: x / Protein: x / Nitrite: x   Leuk Esterase: x / RBC: x / WBC x   Sq Epi: x / Non Sq Epi: x / Bacteria: x        MICRODATA:      RADIOLOGY/OTHER STUDIES:

## 2023-11-02 NOTE — BH CONSULTATION LIAISON ASSESSMENT NOTE - CURRENT MEDICATION
MEDICATIONS  (STANDING):  influenza   Vaccine 0.5 milliLiter(s) IntraMuscular once  LORazepam   Injectable 2 milliGRAM(s) IV Push once    MEDICATIONS  (PRN):

## 2023-11-02 NOTE — ED PROVIDER NOTE - CLINICAL SUMMARY MEDICAL DECISION MAKING FREE TEXT BOX
episode of fully body shaking, unresponsive, stiffness, then followed by lethargy, possible new onset seizure. similar episode 10/10. no focal neuro deficits currently, no HA, doubt sah  -check labs  -ekg  -cxr  -CT head r/o mass  -epilepsy c/s

## 2023-11-02 NOTE — CONSULT NOTE ADULT - ASSESSMENT
17 y/o Male with minimal PMHx in our EMR, admitted after having seizure like activity at home. Had another seizure while in the ED , received Keppra load , 3000mg and admitted to EMU for stabilization and vEEG.     # Seizure like activity   s/p Keppra load. However, Keppra on pause at the moment   Rest of evaluation and management per epilepsy team   IV lorazepam prn ordered in case of seizure.     # Agitation   Antipsychotic dose and frequency per behavioral health consult     High MDM   Acute illness - Seizures:  Close monitoring with : vEEG while on/off antiepileptic medications   Reviewed Hgb, serum sodium, creatinine  Discussed plan of care with epilepsy ACP  Received intravenous lorazepam for seizure like activity, agitation

## 2023-11-02 NOTE — H&P ADULT - ASSESSMENT
Patient is a 17 y/o Male with no PMH who presents after having a seizure like activity at home. The patient has another seizure while in the ED and is loaded with Keppra 3000mg and admitted to EMU for stabilization and vEEG.     PLAN  Neuro  # Nocturnal seizures   -s/p Keppra 3000mg load   - started on Keppra 500mg BID   - vEEG monitoring   - Seizure & Fall precautions  - Ativan 2mg IVP for seizures > 2mins    Pulm  - spo2 goal >92-94%    Misc  F: none  E: replete prn   N: regular diet  A: none  GI: none  DVT ppx: none  FULL CODE  EMU

## 2023-11-03 VITALS
HEART RATE: 62 BPM | DIASTOLIC BLOOD PRESSURE: 67 MMHG | OXYGEN SATURATION: 97 % | RESPIRATION RATE: 16 BRPM | SYSTOLIC BLOOD PRESSURE: 119 MMHG | TEMPERATURE: 98 F

## 2023-11-03 DIAGNOSIS — Z29.9 ENCOUNTER FOR PROPHYLACTIC MEASURES, UNSPECIFIED: ICD-10-CM

## 2023-11-03 DIAGNOSIS — R45.1 RESTLESSNESS AND AGITATION: ICD-10-CM

## 2023-11-03 DIAGNOSIS — R56.9 UNSPECIFIED CONVULSIONS: ICD-10-CM

## 2023-11-03 DIAGNOSIS — F44.5 CONVERSION DISORDER WITH SEIZURES OR CONVULSIONS: ICD-10-CM

## 2023-11-03 LAB
AMPHET UR-MCNC: NEGATIVE — SIGNIFICANT CHANGE UP
AMPHET UR-MCNC: NEGATIVE — SIGNIFICANT CHANGE UP
ANION GAP SERPL CALC-SCNC: 10 MMOL/L — SIGNIFICANT CHANGE UP (ref 5–17)
ANION GAP SERPL CALC-SCNC: 10 MMOL/L — SIGNIFICANT CHANGE UP (ref 5–17)
BARBITURATES UR SCN-MCNC: NEGATIVE — SIGNIFICANT CHANGE UP
BARBITURATES UR SCN-MCNC: NEGATIVE — SIGNIFICANT CHANGE UP
BENZODIAZ UR-MCNC: NEGATIVE — SIGNIFICANT CHANGE UP
BENZODIAZ UR-MCNC: NEGATIVE — SIGNIFICANT CHANGE UP
BUN SERPL-MCNC: 12 MG/DL — SIGNIFICANT CHANGE UP (ref 7–23)
BUN SERPL-MCNC: 12 MG/DL — SIGNIFICANT CHANGE UP (ref 7–23)
CALCIUM SERPL-MCNC: 9.3 MG/DL — SIGNIFICANT CHANGE UP (ref 8.4–10.5)
CALCIUM SERPL-MCNC: 9.3 MG/DL — SIGNIFICANT CHANGE UP (ref 8.4–10.5)
CHLORIDE SERPL-SCNC: 105 MMOL/L — SIGNIFICANT CHANGE UP (ref 96–108)
CHLORIDE SERPL-SCNC: 105 MMOL/L — SIGNIFICANT CHANGE UP (ref 96–108)
CK SERPL-CCNC: 191 U/L — SIGNIFICANT CHANGE UP (ref 30–200)
CK SERPL-CCNC: 191 U/L — SIGNIFICANT CHANGE UP (ref 30–200)
CO2 SERPL-SCNC: 24 MMOL/L — SIGNIFICANT CHANGE UP (ref 22–31)
CO2 SERPL-SCNC: 24 MMOL/L — SIGNIFICANT CHANGE UP (ref 22–31)
COCAINE METAB.OTHER UR-MCNC: NEGATIVE — SIGNIFICANT CHANGE UP
COCAINE METAB.OTHER UR-MCNC: NEGATIVE — SIGNIFICANT CHANGE UP
CREAT SERPL-MCNC: 0.96 MG/DL — SIGNIFICANT CHANGE UP (ref 0.5–1.3)
CREAT SERPL-MCNC: 0.96 MG/DL — SIGNIFICANT CHANGE UP (ref 0.5–1.3)
EGFR: 116 ML/MIN/1.73M2 — SIGNIFICANT CHANGE UP
EGFR: 116 ML/MIN/1.73M2 — SIGNIFICANT CHANGE UP
GLUCOSE SERPL-MCNC: 88 MG/DL — SIGNIFICANT CHANGE UP (ref 70–99)
GLUCOSE SERPL-MCNC: 88 MG/DL — SIGNIFICANT CHANGE UP (ref 70–99)
HCT VFR BLD CALC: 42 % — SIGNIFICANT CHANGE UP (ref 39–50)
HCT VFR BLD CALC: 42 % — SIGNIFICANT CHANGE UP (ref 39–50)
HGB BLD-MCNC: 14.3 G/DL — SIGNIFICANT CHANGE UP (ref 13–17)
HGB BLD-MCNC: 14.3 G/DL — SIGNIFICANT CHANGE UP (ref 13–17)
LACTATE SERPL-SCNC: 1.4 MMOL/L — SIGNIFICANT CHANGE UP (ref 0.5–2)
LACTATE SERPL-SCNC: 1.4 MMOL/L — SIGNIFICANT CHANGE UP (ref 0.5–2)
MAGNESIUM SERPL-MCNC: 2 MG/DL — SIGNIFICANT CHANGE UP (ref 1.6–2.6)
MAGNESIUM SERPL-MCNC: 2 MG/DL — SIGNIFICANT CHANGE UP (ref 1.6–2.6)
MCHC RBC-ENTMCNC: 30.2 PG — SIGNIFICANT CHANGE UP (ref 27–34)
MCHC RBC-ENTMCNC: 30.2 PG — SIGNIFICANT CHANGE UP (ref 27–34)
MCHC RBC-ENTMCNC: 34 GM/DL — SIGNIFICANT CHANGE UP (ref 32–36)
MCHC RBC-ENTMCNC: 34 GM/DL — SIGNIFICANT CHANGE UP (ref 32–36)
MCV RBC AUTO: 88.6 FL — SIGNIFICANT CHANGE UP (ref 80–100)
MCV RBC AUTO: 88.6 FL — SIGNIFICANT CHANGE UP (ref 80–100)
METHADONE UR-MCNC: NEGATIVE — SIGNIFICANT CHANGE UP
METHADONE UR-MCNC: NEGATIVE — SIGNIFICANT CHANGE UP
NRBC # BLD: 0 /100 WBCS — SIGNIFICANT CHANGE UP (ref 0–0)
NRBC # BLD: 0 /100 WBCS — SIGNIFICANT CHANGE UP (ref 0–0)
OPIATES UR-MCNC: NEGATIVE — SIGNIFICANT CHANGE UP
OPIATES UR-MCNC: NEGATIVE — SIGNIFICANT CHANGE UP
PCP SPEC-MCNC: SIGNIFICANT CHANGE UP
PCP SPEC-MCNC: SIGNIFICANT CHANGE UP
PCP UR-MCNC: NEGATIVE — SIGNIFICANT CHANGE UP
PCP UR-MCNC: NEGATIVE — SIGNIFICANT CHANGE UP
PHOSPHATE SERPL-MCNC: 4.8 MG/DL — HIGH (ref 2.5–4.5)
PHOSPHATE SERPL-MCNC: 4.8 MG/DL — HIGH (ref 2.5–4.5)
PLATELET # BLD AUTO: 172 K/UL — SIGNIFICANT CHANGE UP (ref 150–400)
PLATELET # BLD AUTO: 172 K/UL — SIGNIFICANT CHANGE UP (ref 150–400)
POTASSIUM SERPL-MCNC: 4.1 MMOL/L — SIGNIFICANT CHANGE UP (ref 3.5–5.3)
POTASSIUM SERPL-MCNC: 4.1 MMOL/L — SIGNIFICANT CHANGE UP (ref 3.5–5.3)
POTASSIUM SERPL-SCNC: 4.1 MMOL/L — SIGNIFICANT CHANGE UP (ref 3.5–5.3)
POTASSIUM SERPL-SCNC: 4.1 MMOL/L — SIGNIFICANT CHANGE UP (ref 3.5–5.3)
PROLACTIN SERPL-MCNC: 18.8 NG/ML — HIGH (ref 4.1–18.4)
PROLACTIN SERPL-MCNC: 18.8 NG/ML — HIGH (ref 4.1–18.4)
RBC # BLD: 4.74 M/UL — SIGNIFICANT CHANGE UP (ref 4.2–5.8)
RBC # BLD: 4.74 M/UL — SIGNIFICANT CHANGE UP (ref 4.2–5.8)
RBC # FLD: 12.6 % — SIGNIFICANT CHANGE UP (ref 10.3–14.5)
RBC # FLD: 12.6 % — SIGNIFICANT CHANGE UP (ref 10.3–14.5)
SODIUM SERPL-SCNC: 139 MMOL/L — SIGNIFICANT CHANGE UP (ref 135–145)
SODIUM SERPL-SCNC: 139 MMOL/L — SIGNIFICANT CHANGE UP (ref 135–145)
THC UR QL: NEGATIVE — SIGNIFICANT CHANGE UP
THC UR QL: NEGATIVE — SIGNIFICANT CHANGE UP
WBC # BLD: 6.55 K/UL — SIGNIFICANT CHANGE UP (ref 3.8–10.5)
WBC # BLD: 6.55 K/UL — SIGNIFICANT CHANGE UP (ref 3.8–10.5)
WBC # FLD AUTO: 6.55 K/UL — SIGNIFICANT CHANGE UP (ref 3.8–10.5)
WBC # FLD AUTO: 6.55 K/UL — SIGNIFICANT CHANGE UP (ref 3.8–10.5)

## 2023-11-03 PROCEDURE — 99239 HOSP IP/OBS DSCHRG MGMT >30: CPT

## 2023-11-03 PROCEDURE — 99233 SBSQ HOSP IP/OBS HIGH 50: CPT

## 2023-11-03 PROCEDURE — 71046 X-RAY EXAM CHEST 2 VIEWS: CPT

## 2023-11-03 PROCEDURE — 73130 X-RAY EXAM OF HAND: CPT

## 2023-11-03 PROCEDURE — 95819 EEG AWAKE AND ASLEEP: CPT

## 2023-11-03 PROCEDURE — 95718 EEG PHYS/QHP 2-12 HR W/VEEG: CPT

## 2023-11-03 PROCEDURE — 83735 ASSAY OF MAGNESIUM: CPT

## 2023-11-03 PROCEDURE — 80307 DRUG TEST PRSMV CHEM ANLYZR: CPT

## 2023-11-03 PROCEDURE — 84100 ASSAY OF PHOSPHORUS: CPT

## 2023-11-03 PROCEDURE — 82550 ASSAY OF CK (CPK): CPT

## 2023-11-03 PROCEDURE — 80048 BASIC METABOLIC PNL TOTAL CA: CPT

## 2023-11-03 PROCEDURE — 70450 CT HEAD/BRAIN W/O DYE: CPT | Mod: MA

## 2023-11-03 PROCEDURE — 73070 X-RAY EXAM OF ELBOW: CPT

## 2023-11-03 PROCEDURE — 85025 COMPLETE CBC W/AUTO DIFF WBC: CPT

## 2023-11-03 PROCEDURE — 80053 COMPREHEN METABOLIC PANEL: CPT

## 2023-11-03 PROCEDURE — 73070 X-RAY EXAM OF ELBOW: CPT | Mod: 26,RT

## 2023-11-03 PROCEDURE — 83605 ASSAY OF LACTIC ACID: CPT

## 2023-11-03 PROCEDURE — 36415 COLL VENOUS BLD VENIPUNCTURE: CPT

## 2023-11-03 PROCEDURE — 73130 X-RAY EXAM OF HAND: CPT | Mod: 26,RT

## 2023-11-03 PROCEDURE — 84146 ASSAY OF PROLACTIN: CPT

## 2023-11-03 PROCEDURE — 85027 COMPLETE CBC AUTOMATED: CPT

## 2023-11-03 PROCEDURE — 82962 GLUCOSE BLOOD TEST: CPT

## 2023-11-03 PROCEDURE — 99285 EMERGENCY DEPT VISIT HI MDM: CPT | Mod: 25

## 2023-11-03 RX ORDER — ACETAMINOPHEN 500 MG
1000 TABLET ORAL ONCE
Refills: 0 | Status: COMPLETED | OUTPATIENT
Start: 2023-11-03 | End: 2023-11-03

## 2023-11-03 RX ADMIN — Medication 1000 MILLIGRAM(S): at 09:44

## 2023-11-03 RX ADMIN — Medication 1000 MILLIGRAM(S): at 10:28

## 2023-11-03 NOTE — BH CONSULTATION LIAISON PROGRESS NOTE - NSBHCHARTREVIEWVS_PSY_A_CORE FT
Vital Signs Last 24 Hrs  T(C): 36.1 (03 Nov 2023 09:13), Max: 36.6 (02 Nov 2023 20:38)  T(F): 97 (03 Nov 2023 09:13), Max: 97.9 (03 Nov 2023 07:45)  HR: 52 (03 Nov 2023 09:13) (42 - 56)  BP: 119/65 (03 Nov 2023 09:13) (99/60 - 119/65)  BP(mean): --  RR: 16 (03 Nov 2023 09:13) (14 - 18)  SpO2: 97% (03 Nov 2023 09:13) (97% - 100%)    Parameters below as of 03 Nov 2023 09:13  Patient On (Oxygen Delivery Method): room air

## 2023-11-03 NOTE — BH CONSULTATION LIAISON PROGRESS NOTE - NSBHCHARTREVIEWLAB_PSY_A_CORE FT
14.3   6.55  )-----------( 172      ( 03 Nov 2023 07:55 )             42.0     11-03    139  |  105  |  12  ----------------------------<  88  4.1   |  24  |  0.96    Ca    9.3      03 Nov 2023 07:55  Phos  4.8     11-03  Mg     2.0     11-03    TPro  7.4  /  Alb  4.5  /  TBili  0.6  /  DBili  x   /  AST  26  /  ALT  27  /  AlkPhos  81  11-02

## 2023-11-03 NOTE — EEG REPORT - NS EEG TEXT BOX
[facility] Department of Neurology  Inpatient Continuous video-Electroencephalography Report    Acquisition Details:  Electroencephalography was acquired using a minimum of 21 channels on an [employer] Neurology system v 9.3.1 with electrode placement according to the standard International 10-20 system following ACNS (American Clinical Neurophysiology Society) guidelines for Long-Term Video EEG monitoring.  Anterior temporal T1 and T2 electrodes were utilized whenever possible.   The [employer] automated spike & seizure detections were all reviewed in detail, in addition to extensive portions of raw EEG.      Daily Updates (from 07:00 am until 07:00 am):  Day 2   Nov 2-3, 2023:   Background:  continuous, with predominantly alpha and beta frequencies.  Generalized Slowing:  None  Symmetry/Focality: No persistent asymmetries of voltage or frequency.     Voltage:  Normal (20+ uV)  Organization:  Appropriate anterior-posterior gradient  Posterior Dominant Rhythm:  9 Hz symmetric, well-organized, and well-modulated  Sleep:  Symmetric, synchronous spindles and K complexes.  Variability:   Yes		Reactivity:  Yes    Spontaneous Activity:  No epileptiform discharges   Events:  1)	No electrographic seizures or significant clinical events occurred during this study.  Provocations:  •	Hyperventilation: was not performed.  •	Photic stimulation: was not performed.  Daily Summary:    1)	There were no findings of active epilepsy, however this alone does not rule out the diagnosis.   2)	There was excess beta diffusely, which may be an expected effect of medications such as benzodiazepines.      Luis Manuel Alba MD  Attending Neurologist, [facility]

## 2023-11-03 NOTE — DISCHARGE NOTE NURSING/CASE MANAGEMENT/SOCIAL WORK - NSDCFUADDAPPT_GEN_ALL_CORE_FT
Psychiatry/Psychology Follow-up:    •	[facility]  o	[email or web address]  o	Three locations  ?	[address] [place] [address]  Phone: [phone]  ?	[address] [facility] [address] [place] [address]  Phone: [phone]  ?	[address] [facility] [address] [place] [address]  Phone: [phone]  o	Medicare, Medicaid, most private insurance and sliding scale  •	[facility]  o	[email or web address]  o	[address]  [place], [place] [address]   [phone] ([phone] for intakes)  o	Medicare, Medicaid, most private insurance (including [employer])  •	[facility]  o	[email or web address]  o	[address], [place] [address]   [phone]  o	Medicare, Medicaid, Aetna, [employer]inity, [employer], BCBS, [facility] [facility], [employer], GHI, Healthfirst, HealthPlus, MetroPlus, Wellcare  •	[facility]  o	[email or web address]  o	[address] [place] [address]  [phone]  Intake hours for new patients: Tuesdays and Thursdays at 8am  o	Medicare, Medicaid, most private insurance  •	[facility]  o	[email or web address]  o	[address] [place] [address]  (corner [address])  Telephone: [phone]  o	Medicare, Medicaid, most private insurance  •	[facility]  o	[email or web address]  o	Two locations  ?	[address] ([address])  [place] [address]  [phone]  ?	[address] [place], [place] [address] [phone]  o	Medicare, Medicaid, most private insurance

## 2023-11-03 NOTE — DISCHARGE NOTE NURSING/CASE MANAGEMENT/SOCIAL WORK - NSDCPEFALRISK_GEN_ALL_CORE
For information on Fall & Injury Prevention, visit: [email or web address] OR  [email or web address] OR  https://www.cdc.gov/steadi/patient.html

## 2023-11-03 NOTE — PROGRESS NOTE ADULT - ASSESSMENT
Patient is a 17 y/o Male with no PMH who presents after having a seizure like activity at home. The patient had another episode while in the ED concerning for seizure and was given Ativan 2 mg IV and loaded with Keppra 3000mg. Workup in head with LA 0.7 and negative CTH. Admitted to EMU for further evaluation and vEEG monitoring. On 11/2 patient agitated and aggressive, code gray x2 called and received a total of 7.5 mg Zyprexa. Initially refusing EEG, but now more agreeable. Event captured on EEG of full body shaking with no correlate. Reviewed videos of events recorded at home and appeared to have asymmetric limb jerking. Episodes of seizure like activity likely non-epileptic in origin.   Patient is a 19 y/o Male with no PMH who presents after having a seizure like activity at home. The patient had another episode while in the ED concerning for seizure and was given Ativan 2 mg IV and loaded with Keppra 3000mg. Workup in head with LA 0.7 and negative CTH. Admitted to EMU for further evaluation and vEEG monitoring. On 11/2 patient agitated and aggressive, code gray x2 called and received a total of 7.5 mg Zyprexa. Initially refusing EEG, but now more agreeable. Event captured on EEG of full body shaking with no correlate. Reviewed videos of events recorded at home and appeared to have asymmetric limb jerking. 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

## 2023-11-03 NOTE — BH CONSULTATION LIAISON PROGRESS NOTE - NSBHCONSULTFOLLOWAFTERCARE_PSY_A_CORE FT
Follow-up as outpatient per neurology/primary team's recommendations  Patient may also benefit from outpatient psychiatric follow up to help him manage stress and any other psychiatric needs. Follow-up as outpatient per neurology/primary team's recommendations  Patient may also benefit from outpatient psychiatric follow up to help him manage stress and any other psychiatric needs. Can provide following options:    •	[facility]  o	[email or web address]  o	Three locations  ?	[address] [place] [address]  Phone: [phone]  ?	[address] [facility] [address] [place] [address]  Phone: [phone]  ?	[address] [facility] [address] [place] [address]  Phone: [phone]  o	Medicare, Medicaid, most private insurance and sliding scale  •	[facility]  o	[email or web address]  o	[address]  [place], [place] [address]   [phone] ([phone] for intakes)  o	Medicare, Medicaid, most private insurance (including United)  •	[facility]  o	[email or web address]  o	[address] [place] [place] [place] [address]   [phone]  o	Medicare, Medicaid, Aetna, Affinity, Am[employer] Care, BCBS, [employer], [place], GHI, Healthfirst, HealthPlus, MetroPlus, Wellcare  •	[facility]  o	[email or web address]  o	[address] [place] [address]  [phone]  Intake hours for new patients: Tuesdays and Thursdays at 8am  o	Medicare, Medicaid, most private insurance  •	[facility] [place]  o	[email or web address]  o	[address] [place] [address]  (corner of [address] and [facility])  Telephone: [phone]  o	Medicare, Medicaid, most private insurance  •	[facility]  o	[email or web address]  o	Two locations  ?	[address] ([address])  [place] [address]  [phone]  ?	[address] [place], [place] [address] [phone]  o	Medicare, Medicaid, most private insurance

## 2023-11-03 NOTE — BH CONSULTATION LIAISON PROGRESS NOTE - NSBHATTESTCOMMENTATTENDFT_PSY_A_CORE
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 Per history, agitation and confusion began after administration of Keppra yesterday, with disorientation, erratic behavior, and physical aggression toward staff and hospital property that required several rounds of PRN olanzapine with eventual good response; no prior known history of violence and no significant recurrent agitation though some confusion and anger after similar seizure-like episode overnight.    On exam today, pt presents with no recollection of events, no persisting agitation, no other acute psychiatric pathology identified though guarded about h/o depression and NSSIB in high school. Collateral hx from mother suggests concern about poor stress management iso mother's acute health issues.     At this time, ddx includes delirium from medication side effect, seizure/post-ictal state, and conversion disorder. At low acute risk for agitation given improvement in condition, discontinuation of Keppra, though chronic risk somewhat elevated given recent events, possible conversion d/o, and possibly impaired coping.     Recommendations: Pt is psychiatrically stable for dc. Education provided about stress management and possible PNES. Please provide with referral information (above) for OP psychotherapy. D/w pt's mother and primary team.

## 2023-11-03 NOTE — PROGRESS NOTE ADULT - ASSESSMENT
19 y/o Male with minimal PMHx in our EMR, admitted after having seizure like activity at home. Had another seizure while in the ED , received Keppra load , 3000mg and admitted to EMU for stabilization and vEEG.     # Seizure like activity   s/p Keppra load. However, Keppra on pause at the moment   Rest of evaluation and management per epilepsy team   IV lorazepam prn ordered in case of seizure.     # Agitation   Antipsychotic dose and frequency per behavioral health consult   received 2 doses of zyprexa yesterday ; calm this AM     High MDM   Acute illness - Seizure like activity   Reviewed Hgb , WBC, serum Cr   Discussed plan of care with epilepsy ACP

## 2023-11-03 NOTE — PROGRESS NOTE ADULT - PROBLEM SELECTOR PLAN 3
Fluids: None  Electrolytes: Replete to keep K>4 and Mg>2  Nutrition: Regular Diet  GI ppx: None   DVT ppx: None, low risk improve score  Code: Full code  Dispo: [facility] Crowell Fluids: None  Electrolytes: Replete to keep K>4 and Mg>2  Nutrition: Regular Diet  GI ppx: None   DVT ppx: None, low risk improve score  Code: Full code  Dispo: Discharge home

## 2023-11-03 NOTE — BH CONSULTATION LIAISON PROGRESS NOTE - NSBHASSESSMENTFT_PSY_ALL_CORE
Patient is a 18-year-old male domiciled in private residence with his mother, employed part-time at [employer] with plans to attend a graduate program soon, no past psychiatric diagnoses, hospitalizations, medication, or therapy, admitted on 11/1 for seizure-like activity. EEG during his hospital stay has not yet demonstrated electrical activity consistent with neurogenic seizures; other etiologies including psychogenic non-epileptic seizures are being considered. On interview today, patient was drowsy likely due to sedating medications, but calm and cooperative and AOx3 (reported date as Thursday, Nov 2, 2023). He demonstrated good awareness of why he was in the hospital. Although patient was told he had episodes of agitation, he has no memory of the events. Patient and his mother deny any history of aggression or excessive agitation at home. Denies history of substance use. Also denies symptoms concerning for psychotic episodes. Patient's mother reports that agitation has improved in the last day, as patient can be quickly re-oriented when she is at bedside. Patient's episodes of agitation likely due to medication side effect (2/2 levetiracetam) vs. possible confusion in post-ictal state. Given patient's calm disposition during today's encounter and improvement in orientation over last 24 hours, recommendation is to discontinue sitter.    In addition, patient endorses depressed mood in the past, including prior suicidal ideation without intent or plan. Patient denies any current suicidal ideation. Given this history and given his mother's concerns over recent stressors (see HPI), continued outpatient psychiatric management is advisable.  Patient is a 18-year-old male domiciled in private residence with his mother, employed part-time at [employer] with plans to attend a graduate program soon, no past psychiatric diagnoses, hospitalizations, medication, or therapy, admitted on 11/1 for seizure-like activity. EEG during his hospital stay has not yet demonstrated electrical activity consistent with neurogenic seizures; other etiologies including psychogenic non-epileptic seizures are being considered. On interview today, patient was drowsy likely due to sedating medications, but calm and cooperative and AOx3 (reported date as Thursday, Nov 2, 2023). He demonstrated good awareness of why he was in the hospital. Although patient was told he had episodes of agitation, he has no memory of the events. Patient and his mother deny any history of aggression or excessive agitation at home. Denies history of substance use. Also denies symptoms concerning for psychotic episodes. Patient's mother reports that agitation has improved in the last day, as patient can be quickly re-oriented when she is at bedside. Patient's episodes of agitation likely due to medication side effect (2/2 levetiracetam) vs. possible confusion in post-ictal state. Given patient's calm disposition during today's encounter and improvement in orientation over last 24 hours, recommendation is to discontinue sitter.    In addition, patient endorses depressed mood in the past, including prior suicidal ideation without intent or plan. Patient denies any current suicidal ideation. Given this history, primary team's concern about possible conversion disorder, and given his mother's concerns over recent stressors (see HPI), continued outpatient psychiatric management is advisable.     RECOMMENDATIONS  -pt is psychiatrically stable for discharge  -brief psychoeducation provided to pt's mother about possible PNES and treatment recommendations  -please see OP referral options below  -d/w primary team

## 2023-11-03 NOTE — PROGRESS NOTE ADULT - PROBLEM SELECTOR PLAN 2
Code gray x2 for agitation and aggression towards staff. Zyprexa 7.5 mg IM total given. Psych consulted and recommended continuing 1:1 and Zyprexa 5 mg PO/IM Q8HRS PRN for agitation.     - Continue 1:1  - Zyprexa 5 mg PO/IM Q8HRS PRN for agitation  - Needs psych re-eval prior to discharge home Code gray x2 for agitation and aggression towards staff. Zyprexa 7.5 mg IM total given. Psych consulted and recommended continuing 1:1 and Zyprexa 5 mg PO/IM Q8HRS PRN for agitation.     - Psych discontinued constant observation  - Psych cleared patient for discharge home and provided outpatient referrals  - Zyprexa 5 mg PO/IM Q8HRS PRN for agitation

## 2023-11-03 NOTE — DISCHARGE NOTE NURSING/CASE MANAGEMENT/SOCIAL WORK - PATIENT PORTAL LINK FT
You can access the FollowMyHealth Patient Portal offered by [facility] by registering at the following website: [email or web address]. By joining [facility]’s FollowMyHealth portal, you will also be able to view your health information using other applications (apps) compatible with our system.

## 2023-11-03 NOTE — BH CONSULTATION LIAISON PROGRESS NOTE - NSICDXBHTERTIARYDX_PSY_ALL_CORE
R/O Psychogenic nonepileptic seizure   F44.5  R/O Medication side effect   T88.7XXA  R/O Post-ictal state   R56.9

## 2023-11-03 NOTE — PROGRESS NOTE ADULT - SUBJECTIVE AND OBJECTIVE BOX
Inteval history:    No events overnight. No complaints currently.    ROS  As above, otherwise negative for constitutional/HEENT/CV/pulm/GI//MSK/neuro/derm/endocrine/psych.     MEDICATIONS  (STANDING):  influenza   Vaccine 0.5 milliLiter(s) IntraMuscular once  LORazepam   Injectable 2 milliGRAM(s) IV Push once    MEDICATIONS  (PRN):      T(C): 36.3 (11-03-23 @ 06:15), Max: 36.6 (11-02-23 @ 20:38)  HR: 52 (11-03-23 @ 06:15) (42 - 56)  BP: 99/60 (11-03-23 @ 06:15) (99/60 - 107/60)  RR: 16 (11-03-23 @ 06:15) (14 - 18)  SpO2: 97% (11-03-23 @ 06:15) (97% - 100%)  Wt(kg): --    General:  Constitutional:  Sitting comfortably in NAD.  Ears, Nose, Throat: no abnormalities, mucus membranes moist  Neck: supple, no lymphadenopathy  Extremities: no edema, clubbing or cyanosis  Skin: no rash or neurocutaneous signs     Cognitive:  Orientation, language, memory and knowledge screens intact.    Cranial Nerves:  II: SANTO. III/IV/VI: EOM Full.  Absent nystagmus  V1V2V3: Symmetric, VII: Face appears symmetric VIII: Normal to screening, IX/X: Palate Elevates Symmetrical  XI: Trapezius Symmetric  XII: Tongue midline  Motor:  Power: no pronator drift, power 5/5 distal U/E  Tone: normal x 4 limbs  No tremor  Coordination/Gait:  Finger-nose intact, normal finger taps and rapid-alternating movements,   Narrow based gait, tandem forward   hops well on both feet  Reflexes:  DTR: 2+ symmetric all 4 limbs, no clonus      Investigations:  CBC Full  -  ( 02 Nov 2023 03:35 )  WBC Count : 11.65 K/uL  RBC Count : 4.48 M/uL  Hemoglobin : 13.7 g/dL  Hematocrit : 39.4 %  Platelet Count - Automated : 180 K/uL  Mean Cell Volume : 87.9 fl  Mean Cell Hemoglobin : 30.6 pg  Mean Cell Hemoglobin Concentration : 34.8 gm/dL  Auto Neutrophil # : 7.61 K/uL  Auto Lymphocyte # : 2.81 K/uL  Auto Monocyte # : 0.94 K/uL  Auto Eosinophil # : 0.19 K/uL  Auto Basophil # : 0.07 K/uL  Auto Neutrophil % : 65.3 %  Auto Lymphocyte % : 24.1 %  Auto Monocyte % : 8.1 %  Auto Eosinophil % : 1.6 %  Auto Basophil % : 0.6 %    11-02    139  |  103  |  13  ----------------------------<  98  4.3   |  27  |  0.92    Ca    9.5      02 Nov 2023 03:35  Mg     1.9     11-02    TPro  7.4  /  Alb  4.5  /  TBili  0.6  /  DBili  x   /  AST  26  /  ALT  27  /  AlkPhos  81  11-02    LIVER FUNCTIONS - ( 02 Nov 2023 03:35 )  Alb: 4.5 g/dL / Pro: 7.4 g/dL / ALK PHOS: 81 U/L / ALT: 27 U/L / AST: 26 U/L / GGT: x                 EEG: INTERVAL HISTORY    Patient had multiple events of extremity shaking without EEG correlate.     ROS  As above, otherwise negative for constitutional/HEENT/CV/pulm/GI//MSK/neuro/derm/endocrine/psych.     MEDICATIONS  (STANDING):  influenza   Vaccine 0.5 milliLiter(s) IntraMuscular once  LORazepam   Injectable 2 milliGRAM(s) IV Push once    MEDICATIONS  (PRN):      T(C): 36.3 (11-03-23 @ 06:15), Max: 36.6 (11-02-23 @ 20:38)  HR: 52 (11-03-23 @ 06:15) (42 - 56)  BP: 99/60 (11-03-23 @ 06:15) (99/60 - 107/60)  RR: 16 (11-03-23 @ 06:15) (14 - 18)  SpO2: 97% (11-03-23 @ 06:15) (97% - 100%)  Wt(kg): --    General:  Constitutional:  Sitting comfortably in NAD.  Ears, Nose, Throat: no abnormalities, mucus membranes moist  Neck: supple, no lymphadenopathy  Extremities: no edema, clubbing or cyanosis  Skin: no rash or neurocutaneous signs     Cognitive:  Orientation, language, memory and knowledge screens intact.    Cranial Nerves:  II: SANTO. III/IV/VI: EOM Full.  Absent nystagmus  V1V2V3: Symmetric, VII: Face appears symmetric VIII: Normal to screening, IX/X: Palate Elevates Symmetrical  XI: Trapezius Symmetric  XII: Tongue midline  Motor:  Power: no pronator drift, power 5/5 distal U/E  Tone: normal x 4 limbs  No tremor  Coordination/Gait:  Finger-nose intact, normal finger taps and rapid-alternating movements,   Narrow based gait, tandem forward   hops well on both feet  Reflexes:  DTR: 2+ symmetric all 4 limbs, no clonus      Investigations:  CBC Full  -  ( 02 Nov 2023 03:35 )  WBC Count : 11.65 K/uL  RBC Count : 4.48 M/uL  Hemoglobin : 13.7 g/dL  Hematocrit : 39.4 %  Platelet Count - Automated : 180 K/uL  Mean Cell Volume : 87.9 fl  Mean Cell Hemoglobin : 30.6 pg  Mean Cell Hemoglobin Concentration : 34.8 gm/dL  Auto Neutrophil # : 7.61 K/uL  Auto Lymphocyte # : 2.81 K/uL  Auto Monocyte # : 0.94 K/uL  Auto Eosinophil # : 0.19 K/uL  Auto Basophil # : 0.07 K/uL  Auto Neutrophil % : 65.3 %  Auto Lymphocyte % : 24.1 %  Auto Monocyte % : 8.1 %  Auto Eosinophil % : 1.6 %  Auto Basophil % : 0.6 %    11-02    139  |  103  |  13  ----------------------------<  98  4.3   |  27  |  0.92    Ca    9.5      02 Nov 2023 03:35  Mg     1.9     11-02    TPro  7.4  /  Alb  4.5  /  TBili  0.6  /  DBili  x   /  AST  26  /  ALT  27  /  AlkPhos  81  11-02    LIVER FUNCTIONS - ( 02 Nov 2023 03:35 )  Alb: 4.5 g/dL / Pro: 7.4 g/dL / ALK PHOS: 81 U/L / ALT: 27 U/L / AST: 26 U/L / GGT: x                 EEG: INTERVAL HISTORY    Patient had multiple events of extremity shaking without EEG correlate. Previous spike likely artifact due to patient motion and touching EEG leads when agitated on 11/2 and shaking episodes likely psychogenic non-epileptic attacks. Lactate and CK sent were normal. Patient pending psych re-eval. Will likely be able to go home after psych clearance.     ROS  As above, otherwise negative for constitutional/HEENT/CV/pulm/GI//MSK/neuro/derm/endocrine/psych.     MEDICATIONS  (STANDING):  influenza   Vaccine 0.5 milliLiter(s) IntraMuscular once  LORazepam   Injectable 2 milliGRAM(s) IV Push once    MEDICATIONS  (PRN):      T(C): 36.3 (11-03-23 @ 06:15), Max: 36.6 (11-02-23 @ 20:38)  HR: 52 (11-03-23 @ 06:15) (42 - 56)  BP: 99/60 (11-03-23 @ 06:15) (99/60 - 107/60)  RR: 16 (11-03-23 @ 06:15) (14 - 18)  SpO2: 97% (11-03-23 @ 06:15) (97% - 100%)  Wt(kg): --    PHYSICAL EXAM  Neurologic:  -Mental status: Awake, alert, oriented to person, place, and time. Speech is fluent with intact naming, repetition, and comprehension, no dysarthria. Patient states he did not recall episodes from yesterday. Follows commands. Attention/concentration intact. Fund of knowledge appropriate.  -Cranial nerves:   II: Visual fields are full to confrontation.  III, IV, VI: Extraocular movements are intact without nystagmus. Pupils equally round and reactive to light  V:  Facial sensation V1-V3 equal and intact   VII: Face is symmetric with normal eye closure and smile  VIII: Hearing is bilaterally intact to finger rub  IX, X: Uvula is midline and soft palate rises symmetrically  XI: Head turning and shoulder shrug are intact.  XII: Tongue protrudes midline  Motor: Normal bulk and tone. No pronator drift. Strength bilateral upper extremity 5/5, bilateral lower extremities 5/5.  Rapid alternating movements intact and symmetric  Sensation: Intact to light touch bilaterally. No neglect or extinction on double simultaneous testing.  Coordination: No dysmetria on finger-to-nose and heel-to-shin bilaterally  Reflexes: Downgoing toes bilaterally   Gait: Narrow gait and steady      LABS  CBC Full  -  ( 02 Nov 2023 03:35 )  WBC Count : 11.65 K/uL  RBC Count : 4.48 M/uL  Hemoglobin : 13.7 g/dL  Hematocrit : 39.4 %  Platelet Count - Automated : 180 K/uL  Mean Cell Volume : 87.9 fl  Mean Cell Hemoglobin : 30.6 pg  Mean Cell Hemoglobin Concentration : 34.8 gm/dL  Auto Neutrophil # : 7.61 K/uL  Auto Lymphocyte # : 2.81 K/uL  Auto Monocyte # : 0.94 K/uL  Auto Eosinophil # : 0.19 K/uL  Auto Basophil # : 0.07 K/uL  Auto Neutrophil % : 65.3 %  Auto Lymphocyte % : 24.1 %  Auto Monocyte % : 8.1 %  Auto Eosinophil % : 1.6 %  Auto Basophil % : 0.6 %    11-02    139  |  103  |  13  ----------------------------<  98  4.3   |  27  |  0.92    Ca    9.5      02 Nov 2023 03:35  Mg     1.9     11-02    TPro  7.4  /  Alb  4.5  /  TBili  0.6  /  DBili  x   /  AST  26  /  ALT  27  /  AlkPhos  81  11-02    LIVER FUNCTIONS - ( 02 Nov 2023 03:35 )  Alb: 4.5 g/dL / Pro: 7.4 g/dL / ALK PHOS: 81 U/L / ALT: 27 U/L / AST: 26 U/L / GGT: x                 EEG:  Daily Updates (from 07:00 am until 07:00 am):  Day 2   Nov 2-3, 2023:   Background:  continuous, with predominantly alpha and beta frequencies.  Generalized Slowing:  None  Symmetry/Focality: No persistent asymmetries of voltage or frequency.     Voltage:  Normal (20+ uV)  Organization:  Appropriate anterior-posterior gradient  Posterior Dominant Rhythm:  9 Hz symmetric, well-organized, and well-modulated  Sleep:  Symmetric, synchronous spindles and K complexes.  Variability:   Yes		Reactivity:  Yes    Spontaneous Activity:  No epileptiform discharges   Events:  1)	No electrographic seizures or significant clinical events occurred during this study.  Provocations:  •	Hyperventilation: was not performed.  •	Photic stimulation: was not performed.  Daily Summary:    1)	There were no findings of active epilepsy, however this alone does not rule out the diagnosis.   2)	There was excess beta diffusely, which may be an expected effect of medications such as benzodiazepines.

## 2023-11-03 NOTE — PROGRESS NOTE ADULT - SUBJECTIVE AND OBJECTIVE BOX
Patient is a 18y old  Male who presents with a chief complaint of seizures (03 Nov 2023 07:39)    INTERVAL EVENTS:  - tolerating diet,     SUBJECTIVE:  Patient was seen and examined at bedside.  Review of systems: No CP, dyspnea, nausea or vomiting, dysuria, LE edema.     Diet, Regular (11-03-23 @ 07:23) [Active]    MEDICATIONS:  MEDICATIONS  (STANDING):  influenza   Vaccine 0.5 milliLiter(s) IntraMuscular once  LORazepam   Injectable 2 milliGRAM(s) IV Push once    MEDICATIONS  (PRN):      Allergies    No Known Allergies    Intolerances        OBJECTIVE:  Vital Signs Last 24 Hrs  T(C): 36.9 (03 Nov 2023 13:00), Max: 36.9 (03 Nov 2023 13:00)  T(F): 98.4 (03 Nov 2023 13:00), Max: 98.4 (03 Nov 2023 13:00)  HR: 62 (03 Nov 2023 13:00) (52 - 62)  BP: 119/67 (03 Nov 2023 13:00) (99/60 - 119/67)  BP(mean): --  RR: 16 (03 Nov 2023 13:00) (16 - 16)  SpO2: 97% (03 Nov 2023 13:00) (97% - 98%)    Parameters below as of 03 Nov 2023 13:00  Patient On (Oxygen Delivery Method): room air      I&O's Summary      PHYSICAL EXAM:  Gen: Reclining in bed at time of exam, appears stated age  HEENT:  MMM, clear OP  Neck: supple, trachea at midline  CV: RRR, +S1/S2  Pulm: adequate respiratory effort, no increase in work of breathing  Abd: soft, ND  Skin: warm and dry,   Ext: no LE edema ; right arm - able to make fist , extend fingers, elbow with full range of motion   Neuro: AOx3, speaking in full sentences  Psych: affect and behavior appropriate, pleasant at time of interview    LABS:                        14.3   6.55  )-----------( 172      ( 03 Nov 2023 07:55 )             42.0     11-03    139  |  105  |  12  ----------------------------<  88  4.1   |  24  |  0.96    Ca    9.3      03 Nov 2023 07:55  Phos  4.8     11-03  Mg     2.0     11-03    TPro  7.4  /  Alb  4.5  /  TBili  0.6  /  DBili  x   /  AST  26  /  ALT  27  /  AlkPhos  81  11-02    LIVER FUNCTIONS - ( 02 Nov 2023 03:35 )  Alb: 4.5 g/dL / Pro: 7.4 g/dL / ALK PHOS: 81 U/L / ALT: 27 U/L / AST: 26 U/L / GGT: x             CAPILLARY BLOOD GLUCOSE        Urinalysis Basic - ( 03 Nov 2023 07:55 )    Color: x / Appearance: x / SG: x / pH: x  Gluc: 88 mg/dL / Ketone: x  / Bili: x / Urobili: x   Blood: x / Protein: x / Nitrite: x   Leuk Esterase: x / RBC: x / WBC x   Sq Epi: x / Non Sq Epi: x / Bacteria: x        MICRODATA:      RADIOLOGY/OTHER STUDIES:

## 2023-11-03 NOTE — BH CONSULTATION LIAISON PROGRESS NOTE - NSBHCONSULTPRIMARYDISCUSSYES_PSY_A_CORE FT
Relayed conversation / recommendations to Brayden (RN).  Relayed conversation / recommendations to team (Brayden PHILLIPS)

## 2023-11-03 NOTE — BH CONSULTATION LIAISON PROGRESS NOTE - NSBHTIMEACTIVITIESPERFORMED_PSY_A_CORE
chart review, pt interview, psychoeducation and collateral hx from mother, medical decision making, coordination of care with primary team

## 2023-11-03 NOTE — PROVIDER CONTACT NOTE (OTHER) - ASSESSMENT
Patient found with generalized body shaking, unresponsive to voice. Eyes closed. Had an episode of incontinence about 5-7 minutes before this. No cyanosis, tongue biting, foaming, or incontinence during the episode. Episode self resolved. Patient came to and was confused and aggressive and was able to be redirected

## 2023-11-03 NOTE — PROGRESS NOTE ADULT - PROBLEM SELECTOR PLAN 1
Patient with witnessed seizure like activity at home and in the ED. Was given Ativan 2 mg IV and loaded with Keppra 3g x1 and continued on Keppra 500 mg BID. Initially refusing EEG and only allowed a few minutes of recording that showed a possible R sided discharge but could have been artifactual due to touching leads. Agreed to EEG on 11/2 evening. Episode overnight of full body shaking with no changes in vital signs and no correlate on EEG. Patient with witnessed seizure like activity at home and in the ED. Was given Ativan 2 mg IV and loaded with Keppra 3g x1 and continued on Keppra 500 mg BID. Initially refusing EEG and only allowed a few minutes of recording that showed a possible R sided discharge but could have been artifactual due to touching leads. Agreed to EEG on 11/2 evening. 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    - No confirmed seizures, all events without EEG correlate  - No indication for antiseizure medications at this time  - Referrals for outpatient psychiatry will be provided on discharge summary

## 2023-11-08 DIAGNOSIS — R56.9 UNSPECIFIED CONVULSIONS: ICD-10-CM

## 2023-11-08 DIAGNOSIS — T42.6X5A ADVERSE EFFECT OF OTHER ANTIEPILEPTIC AND SEDATIVE-HYPNOTIC DRUGS, INITIAL ENCOUNTER: ICD-10-CM

## 2023-11-08 DIAGNOSIS — F44.5 CONVERSION DISORDER WITH SEIZURES OR CONVULSIONS: ICD-10-CM

## 2023-11-08 DIAGNOSIS — R45.1 RESTLESSNESS AND AGITATION: ICD-10-CM

## 2023-11-08 DIAGNOSIS — F41.9 ANXIETY DISORDER, UNSPECIFIED: ICD-10-CM

## 2024-01-05 PROBLEM — Z00.00 ENCOUNTER FOR PREVENTIVE HEALTH EXAMINATION: Status: ACTIVE | Noted: 2024-01-05

## 2024-01-25 ENCOUNTER — EMERGENCY (EMERGENCY)
Facility: HOSPITAL | Age: 19
LOS: 1 days | Discharge: HOME | End: 2024-01-25
Attending: STUDENT IN AN ORGANIZED HEALTH CARE EDUCATION/TRAINING PROGRAM | Admitting: EMERGENCY MEDICINE
Payer: COMMERCIAL

## 2024-01-25 VITALS
RESPIRATION RATE: 16 BRPM | OXYGEN SATURATION: 96 % | HEART RATE: 62 BPM | WEIGHT: 207.9 LBS | TEMPERATURE: 98 F | SYSTOLIC BLOOD PRESSURE: 144 MMHG | DIASTOLIC BLOOD PRESSURE: 75 MMHG

## 2024-01-25 DIAGNOSIS — G89.29 OTHER CHRONIC PAIN: ICD-10-CM

## 2024-01-25 DIAGNOSIS — R11.2 NAUSEA WITH VOMITING, UNSPECIFIED: ICD-10-CM

## 2024-01-25 DIAGNOSIS — G40.909 EPILEPSY, UNSPECIFIED, NOT INTRACTABLE, WITHOUT STATUS EPILEPTICUS: ICD-10-CM

## 2024-01-25 DIAGNOSIS — M54.9 DORSALGIA, UNSPECIFIED: ICD-10-CM

## 2024-01-25 PROCEDURE — 99284 EMERGENCY DEPT VISIT MOD MDM: CPT

## 2024-01-25 NOTE — ED ADULT TRIAGE NOTE - CHIEF COMPLAINT QUOTE
Pt, who recently had unremarkable EEG on 1/5-1/7, presents to ER c/o neck/back pain s/p witnessed seizure like activity on 11/2 and c/o syncopal episode earlier today with associated N/V after. Pt denies any other associated symptoms at this time. Pt just started on lexapro 5mg daily yesterday.

## 2024-01-26 VITALS
SYSTOLIC BLOOD PRESSURE: 105 MMHG | HEART RATE: 57 BPM | OXYGEN SATURATION: 97 % | TEMPERATURE: 98 F | DIASTOLIC BLOOD PRESSURE: 61 MMHG | RESPIRATION RATE: 16 BRPM

## 2024-01-26 LAB
ANION GAP SERPL CALC-SCNC: 8 MMOL/L — SIGNIFICANT CHANGE UP (ref 5–17)
BASOPHILS # BLD AUTO: 0.04 K/UL — SIGNIFICANT CHANGE UP (ref 0–0.2)
BASOPHILS NFR BLD AUTO: 0.6 % — SIGNIFICANT CHANGE UP (ref 0–2)
BUN SERPL-MCNC: 11 MG/DL — SIGNIFICANT CHANGE UP (ref 7–23)
CALCIUM SERPL-MCNC: 9.7 MG/DL — SIGNIFICANT CHANGE UP (ref 8.4–10.5)
CHLORIDE SERPL-SCNC: 106 MMOL/L — SIGNIFICANT CHANGE UP (ref 96–108)
CK SERPL-CCNC: 334 U/L — HIGH (ref 30–200)
CK SERPL-CCNC: SIGNIFICANT CHANGE UP (ref 30–200)
CO2 SERPL-SCNC: 26 MMOL/L — SIGNIFICANT CHANGE UP (ref 22–31)
CREAT SERPL-MCNC: 0.84 MG/DL — SIGNIFICANT CHANGE UP (ref 0.5–1.3)
EGFR: 128 ML/MIN/1.73M2 — SIGNIFICANT CHANGE UP
EOSINOPHIL # BLD AUTO: 0.16 K/UL — SIGNIFICANT CHANGE UP (ref 0–0.5)
EOSINOPHIL NFR BLD AUTO: 2.5 % — SIGNIFICANT CHANGE UP (ref 0–6)
ETHANOL SERPL-MCNC: <10 MG/DL — SIGNIFICANT CHANGE UP (ref 0–10)
GLUCOSE SERPL-MCNC: 96 MG/DL — SIGNIFICANT CHANGE UP (ref 70–99)
HCT VFR BLD CALC: 39.9 % — SIGNIFICANT CHANGE UP (ref 39–50)
HGB BLD-MCNC: 14.2 G/DL — SIGNIFICANT CHANGE UP (ref 13–17)
IMM GRANULOCYTES NFR BLD AUTO: 0.3 % — SIGNIFICANT CHANGE UP (ref 0–0.9)
LIDOCAIN IGE QN: 25 U/L — SIGNIFICANT CHANGE UP (ref 7–60)
LYMPHOCYTES # BLD AUTO: 2.57 K/UL — SIGNIFICANT CHANGE UP (ref 1–3.3)
LYMPHOCYTES # BLD AUTO: 39.8 % — SIGNIFICANT CHANGE UP (ref 13–44)
MAGNESIUM SERPL-MCNC: 2.2 MG/DL — SIGNIFICANT CHANGE UP (ref 1.6–2.6)
MCHC RBC-ENTMCNC: 31.3 PG — SIGNIFICANT CHANGE UP (ref 27–34)
MCHC RBC-ENTMCNC: 35.6 GM/DL — SIGNIFICANT CHANGE UP (ref 32–36)
MCV RBC AUTO: 88.1 FL — SIGNIFICANT CHANGE UP (ref 80–100)
MONOCYTES # BLD AUTO: 0.68 K/UL — SIGNIFICANT CHANGE UP (ref 0–0.9)
MONOCYTES NFR BLD AUTO: 10.5 % — SIGNIFICANT CHANGE UP (ref 2–14)
NEUTROPHILS # BLD AUTO: 2.98 K/UL — SIGNIFICANT CHANGE UP (ref 1.8–7.4)
NEUTROPHILS NFR BLD AUTO: 46.3 % — SIGNIFICANT CHANGE UP (ref 43–77)
NRBC # BLD: 0 /100 WBCS — SIGNIFICANT CHANGE UP (ref 0–0)
PLATELET # BLD AUTO: 170 K/UL — SIGNIFICANT CHANGE UP (ref 150–400)
POTASSIUM SERPL-MCNC: 3.8 MMOL/L — SIGNIFICANT CHANGE UP (ref 3.5–5.3)
POTASSIUM SERPL-MCNC: SIGNIFICANT CHANGE UP (ref 3.5–5.3)
POTASSIUM SERPL-SCNC: 3.8 MMOL/L — SIGNIFICANT CHANGE UP (ref 3.5–5.3)
POTASSIUM SERPL-SCNC: SIGNIFICANT CHANGE UP (ref 3.5–5.3)
RBC # BLD: 4.53 M/UL — SIGNIFICANT CHANGE UP (ref 4.2–5.8)
RBC # FLD: 12.1 % — SIGNIFICANT CHANGE UP (ref 10.3–14.5)
SODIUM SERPL-SCNC: 140 MMOL/L — SIGNIFICANT CHANGE UP (ref 135–145)
WBC # BLD: 6.45 K/UL — SIGNIFICANT CHANGE UP (ref 3.8–10.5)
WBC # FLD AUTO: 6.45 K/UL — SIGNIFICANT CHANGE UP (ref 3.8–10.5)

## 2024-01-26 PROCEDURE — 85025 COMPLETE CBC W/AUTO DIFF WBC: CPT

## 2024-01-26 PROCEDURE — 36415 COLL VENOUS BLD VENIPUNCTURE: CPT

## 2024-01-26 PROCEDURE — 99284 EMERGENCY DEPT VISIT MOD MDM: CPT | Mod: 25

## 2024-01-26 PROCEDURE — 83735 ASSAY OF MAGNESIUM: CPT

## 2024-01-26 PROCEDURE — 80076 HEPATIC FUNCTION PANEL: CPT

## 2024-01-26 PROCEDURE — 83690 ASSAY OF LIPASE: CPT

## 2024-01-26 PROCEDURE — 80048 BASIC METABOLIC PNL TOTAL CA: CPT

## 2024-01-26 PROCEDURE — 82550 ASSAY OF CK (CPK): CPT

## 2024-01-26 PROCEDURE — 84132 ASSAY OF SERUM POTASSIUM: CPT

## 2024-01-26 PROCEDURE — 96374 THER/PROPH/DIAG INJ IV PUSH: CPT

## 2024-01-26 PROCEDURE — 82962 GLUCOSE BLOOD TEST: CPT

## 2024-01-26 PROCEDURE — 80307 DRUG TEST PRSMV CHEM ANLYZR: CPT

## 2024-01-26 RX ORDER — SODIUM CHLORIDE 9 MG/ML
1000 INJECTION INTRAMUSCULAR; INTRAVENOUS; SUBCUTANEOUS ONCE
Refills: 0 | Status: COMPLETED | OUTPATIENT
Start: 2024-01-26 | End: 2024-01-26

## 2024-01-26 RX ORDER — ONDANSETRON 8 MG/1
4 TABLET, FILM COATED ORAL ONCE
Refills: 0 | Status: COMPLETED | OUTPATIENT
Start: 2024-01-26 | End: 2024-01-26

## 2024-01-26 RX ADMIN — ONDANSETRON 4 MILLIGRAM(S): 8 TABLET, FILM COATED ORAL at 00:30

## 2024-01-26 RX ADMIN — SODIUM CHLORIDE 1000 MILLILITER(S): 9 INJECTION INTRAMUSCULAR; INTRAVENOUS; SUBCUTANEOUS at 00:31

## 2024-01-26 NOTE — ED PROVIDER NOTE - CLINICAL SUMMARY MEDICAL DECISION MAKING FREE TEXT BOX
17 yo male in the Er c/o episode of seizure-like activity at home tonight. As per his girlfriend, pt usually has neck and back pain every night that follows body shaking and jerking movements for a brief period of time. Pt usually very slow and confused after his shaking stops. Girlfriend states its been going on since November last year. Pt was seen in the ER for seizures at that time. Pt f/u with neurologist after, recently had normal EEG (01/05/24)  and was prescribed lexapro. Pt started taking it 2 days ago. Tonight also after his shaking pt vomit a few times and c/o nausea. No head injury, no fever, chills, cold or flu-like symptoms, no illicit drug use or etoh abuse.  pt afebrile, appears postictal -like, denies any HA, head injury, bowel or bladder incontinence. Girlfriend reports the same symptoms are every night usually from 8-10 pm. labs done and pt received zofran and IVF. noted improvement and denies any nausea. exam- unremarkable. will d/c for further out pt f/u with his neurologist as scheduled.

## 2024-01-26 NOTE — ED PROVIDER NOTE - OBJECTIVE STATEMENT
17 yo male in the Er c/o episode of seizure-like activity at home tonight. As per his girlfriend, pt usually has neck and back pain every night that follows body shaking and jerking movements for a brief period of time. Pt usually very slow and confused after his shaking stops. Girlfriend states its been going on since November last year. Pt was seen in the ER for seizures at that time. Pt f/u with neurologist after, recently had normal EEG (01/05/24)  and was prescribed lexapro. Pt started taking it 2 days ago. Tonight also after his shaking pt vomit a few times and c/o nausea. No head injury, no fever, chills, cold or flu-like symptoms, no illicit drug use or etoh abuse.

## 2024-01-26 NOTE — ED PROVIDER NOTE - PRINCIPAL DIAGNOSIS
89yFemale large B cell lymphoma on immunotherapy, JASBIR, ITP presents for weakness. GI consulted for anemia and possible GI bleed. Patient reports possible rectal bleeding on and off for a month as well as hematuria. Patient reports colonoscopy a long time ago and normal.    Problem 1-Abdominal pain and distention  Rec  -appreciate CT scan, favoring ileus, distention improving, pain subsiding     Problem 2-Anemia possible rectal bleeding for a month, melena?  rectal exam-brown stool in vault  ddx hemorrhoids, r/o malignancy,   h and h stable   Ill-defined right hepatic lobe hypodensity measuring about 1.4 cm on series 4 image 79, new since prior. Cannot exclude metastatic disease.  Interval decrease in size of the spleen now measuring 12.6 cm in craniocaudal dimension, previously 16.8 and 6/6/2017.  Multiple prominent periportal and gastrohepatic lymph nodes previously measuring up to 1.6 cm in short axis now measure up to 1.1 cm (series 4 which 101).  Rec  -CT appreciated   -If patient continues to bleed, and when sodium above 130, and if patient is still inpatient can aim for EGD/Colonoscopy next week when patient optimized   -Anusol cream  -Maintain Hemodynamic Stability   -Monitor CBC  -CMP,Optimize Electrolytes  -Transfuse prn to hgb >8  -Two large bore IV lines  - PPI BID  -Monitor Vital Signs  -Monitor Stool For blood, frequency, consistency, melena  -Active Type and Screen   Nausea and vomiting

## 2024-01-26 NOTE — ED ADULT NURSE NOTE - OBJECTIVE STATEMENT
Pt presents to the ED with multiple medical complaints. Per pt, he has had neck and back pain for months, has a history of stressed induced seizures, started on lexapro, also states he had a witnessed syncopal episode today lasting 2 minutes with LOC, denies head injury. On arrival, pt is alert and oriented, ambulatory, no neuro deficits noted.

## 2024-01-26 NOTE — ED PROVIDER NOTE - ATTENDING APP SHARED VISIT CONTRIBUTION OF CARE
19 yo male in the Er c/o episode of seizure-like activity at home tonight. As per his girlfriend, pt usually has neck and back pain every night that follows body shaking and jerking movements for a brief period of time. Pt usually very slow and confused after his shaking stops. Girlfriend states its been going on since November last year. Pt was seen in the ER for seizures at that time. Pt f/u with neurologist after, recently had normal EEG (01/05/24)  and was prescribed lexapro. Pt started taking it 2 days ago. Tonight also after his shaking pt vomit a few times and c/o nausea. No head injury, no fever, chills, cold or flu-like symptoms, no illicit drug use or etoh abuse.  pt afebrile, appears postictal -like, denies any HA, head injury, bowel or bladder incontinence. Girlfriend reports the same symptoms are every night usually from 8-10 pm. labs done and pt received zofran and IVF. noted improvement and denies any nausea. exam- unremarkable. will d/c for further out pt f/u with his neurologist as scheduled.

## 2024-01-26 NOTE — ED PROVIDER NOTE - NSFOLLOWUPINSTRUCTIONS_ED_ALL_ED_FT
Thank you for visiting [facility] Emergency Department.      Please know that no emergency visit is complete without follow-up with your neurologist and your psychiatrist within a week.  Please bring copies of all discharge papers and results and show to your doctor.    Please continue taking all previous medications as instructed unless we discussed otherwise.   I appreciated your patience and hope you feel better soon.   Return to ER immediately if you develop fevers, chills, chest pain, shortness of breath, worsening dizziness, and/or any concerning symptoms.

## 2024-01-26 NOTE — ED PROVIDER NOTE - PATIENT PORTAL LINK FT
You can access the FollowMyHealth Patient Portal offered by [facility] by registering at the following website: [email or web address]. By joining [facility]’s FollowMyHealth portal, you will also be able to view your health information using other applications (apps) compatible with our system.

## 2024-01-26 NOTE — ED PROVIDER NOTE - IV ALTEPLASE EXCL ABS HIDDEN
Called patient to schedule OB check with Justo and OV with the doctor as well as more  testing appointments, no answer, unable to leave message, mailbox is full.   
show

## 2025-02-25 NOTE — BH CONSULTATION LIAISON PROGRESS NOTE - NSBHFUPINTERVALHXFT_PSY_A_CORE
INR today was 7.0   Please double check with the pt if she has any bleeding from the skin, teeth, oral, stool or urine.   If there is no any concern for bleeding, please ask pt to hold warfarin for 3 days, today, tomorrow and Thursday, and recheck INR on Friday 2/28/2025.    Patient was sleeping but arousable with mother at bedside. Patient is domiciled in private residence with his mother, currently employed part-time at [employer] with plans to attend a graduate program soon, no past psychiatric diagnoses, hospitalizations, medication, or therapy. On interview, patient was drowsy, but calm and cooperative and AOx3 (reported date as Thursday, Nov 2, 2023). Patient recounted the events leading up to his hospitalization. He understands that he apparently had episodes of agitation in the hospital but has no memory of them. He reports no history of aggression or excessive agitation at home. Patient endorses depressive mood in the past, including thoughts of ending his life, but without suicidal intent or plan. Patient denies having current suicidal ideation. Patient denies any history of substance use (alcohol, marijuana, cigarettes, recreational drugs). Denies visual or auditory hallucinations.     Patient's mother reports that agitation has improved in the last day, as patient can be quickly re-oriented when she is at bedside. Privately, patient's mother disclosed that her concerns that pt is holding in a lot of stress regarding her own new medical condition that has required recent hospitalization.  Psychiatry f/u for agitation management. Interim hx reviewed - pt with several seizure-like episodes with confusion and mild agitation after, able to be verbally redirected. Per d/w primary team, vEEG indicated no epileptic activity. Pt is reportedly planned for dc today    On interiew, patient was sleeping but arousable with mother at bedside. Patient is domiciled in private residence with his mother, currently employed part-time at [employer] with plans to attend a graduate program soon, no past psychiatric diagnoses, hospitalizations, medication, or therapy. On interview, patient was drowsy, but calm and cooperative and AOx3 (reported date as Thursday, Nov 2, 2023). Patient recounted the events leading up to his hospitalization. He understands that he apparently had episodes of agitation in the hospital but has no memory of them. He reports no history of aggression or excessive agitation at home. Patient endorses depressive mood in the past, including thoughts of ending his life and possible past NSSIB (was vague), but without suicidal intent or plan. Patient denies having current suicidal ideation. Patient denies any history of substance use (alcohol, marijuana, cigarettes, recreational drugs). Denies visual or auditory hallucinations. Denies any recent stressors.    Patient's mother reports that agitation has improved in the last day, as patient can be quickly re-oriented when she is at bedside.     Privately, patient's mother disclosed that her concerns that pt is holding in a lot of stress regarding her own new medical condition that has required recent hospitalization. Education provided about PNES.
